# Patient Record
Sex: FEMALE | Race: WHITE | NOT HISPANIC OR LATINO | Employment: OTHER | URBAN - METROPOLITAN AREA
[De-identification: names, ages, dates, MRNs, and addresses within clinical notes are randomized per-mention and may not be internally consistent; named-entity substitution may affect disease eponyms.]

---

## 2017-04-17 DIAGNOSIS — E07.9 THYROID EYE DISEASE: ICD-10-CM

## 2017-04-17 DIAGNOSIS — H57.89 THYROID EYE DISEASE: ICD-10-CM

## 2017-04-17 RX ORDER — CYCLOSPORINE 0.5 MG/ML
1 EMULSION OPHTHALMIC 2 TIMES DAILY
Qty: 180 EACH | Refills: 1 | Status: SHIPPED | OUTPATIENT
Start: 2017-04-17 | End: 2018-08-28

## 2017-04-17 NOTE — TELEPHONE ENCOUNTER
cycloSPORINE (RESTASIS) 0.05 % ophthalmic emulsion  Last Written Prescription Date:  6/23/15  Last Fill Quantity: 180,   # refills: 4  Last Office Visit with G, [facility] or [facility] prescribing provider:  4/26/16  Future Office visit:   none    Routing refill request to provider for review/approval because:   cycloSPORINE (RESTASIS) 0.05 % ophthalmic emulsion.  Needs annual appt.

## 2017-07-20 ENCOUNTER — OFFICE VISIT (OUTPATIENT)
Dept: OPHTHALMOLOGY | Facility: CLINIC | Age: 72
End: 2017-07-20
Attending: OPHTHALMOLOGY
Payer: MEDICARE

## 2017-07-20 DIAGNOSIS — H17.9 CORNEAL SCAR: Primary | ICD-10-CM

## 2017-07-20 DIAGNOSIS — H16.203 KERATOCONJUNCTIVITIS, BILATERAL: ICD-10-CM

## 2017-07-20 DIAGNOSIS — H33.8 OLD RETINAL DETACHMENT, PARTIAL: ICD-10-CM

## 2017-07-20 DIAGNOSIS — H10.11 ACUTE ATOPIC CONJUNCTIVITIS OF RIGHT EYE: ICD-10-CM

## 2017-07-20 DIAGNOSIS — E07.9 THYROID EYE DISEASE: ICD-10-CM

## 2017-07-20 DIAGNOSIS — H57.89 THYROID EYE DISEASE: ICD-10-CM

## 2017-07-20 PROCEDURE — 99214 OFFICE O/P EST MOD 30 MIN: CPT | Mod: ZF

## 2017-07-20 PROCEDURE — 92015 DETERMINE REFRACTIVE STATE: CPT | Mod: ZF

## 2017-07-20 ASSESSMENT — TONOMETRY
OD_IOP_MMHG: 13
IOP_METHOD: ICARE
OS_IOP_MMHG: 12

## 2017-07-20 ASSESSMENT — CONF VISUAL FIELD
OD_NORMAL: 1
OS_NORMAL: 1

## 2017-07-20 ASSESSMENT — CUP TO DISC RATIO
OD_RATIO: 0.1
OS_RATIO: 0.1

## 2017-07-20 ASSESSMENT — VISUAL ACUITY
METHOD: SNELLEN - LINEAR
OD_PH_SC: 20/80-
OS_SC: 20/20-2
OD_SC: 20/100

## 2017-07-20 ASSESSMENT — REFRACTION_MANIFEST
OS_AXIS: 105
OS_CYLINDER: +0.50
OS_SPHERE: -0.75
OS_ADD: +2.50
OD_SPHERE: PLANO
OD_ADD: +2.50

## 2017-07-20 ASSESSMENT — EXTERNAL EXAM - RIGHT EYE: OD_EXAM: NORMAL

## 2017-07-20 NOTE — MR AVS SNAPSHOT
After Visit Summary   7/20/2017    Annabelle Newsome    MRN: [id number]           Patient Information     Date Of Birth          1945        Visit Information        Provider Department      7/20/2017 9:30 AM Aayush Maria MD [facility]        Today's Diagnoses     Corneal scar - Right Eye    -  1    Thyroid eye disease        Old retinal detachment, partial        Keratoconjunctivitis, bilateral        Acute atopic conjunctivitis of right eye           Follow-ups after your visit        Follow-up notes from your care team     Return in about 1 year (around 7/20/2018) for Annual Visit.      Who to contact     Please call your clinic at [phone] to:    Ask questions about your health    Make or cancel appointments    Discuss your medicines    Learn about your test results    Speak to your doctor   If you have compliments or concerns about an experience at your clinic, or if you wish to file a complaint, please contact [facility] Patient Relations at [phone] or email us at [email or web address]         Additional Information About Your Visit        MyChart Information     [employer] gives you secure access to your electronic health record. If you see a primary care provider, you can also send messages to your care team and make appointments. If you have questions, please call your primary care clinic.  If you do not have a primary care provider, please call [phone] and they will assist you.      [employer] is an electronic gateway that provides easy, online access to your medical records. With [employer], you can request a clinic appointment, read your test results, renew a prescription or communicate with your care team.     To access your existing account, please contact your [facility] or call [phone] for assistance.        Care EveryWhere ID     This is your Care EveryWhere ID. This could be used by other  organizations to access your [place] medical records  [id number]         Blood Pressure from Last 3 Encounters:   No data found for BP    Weight from Last 3 Encounters:   No data found for Wt              Today, you had the following     No orders found for display         Today's Medication Changes          These changes are accurate as of: 7/20/17 10:10 AM.  If you have any questions, ask your nurse or doctor.               Stop taking these medicines if you haven't already. Please contact your care team if you have questions.     carboxymethylcellulose sodium 0.5 % Soln ophthalmic solution   Generic drug:  carboxymethylcellulose   Stopped by:  Aayush Maria MD                Where to get your medicines      These medications were sent to [id number] - [place] - [address] AT [address]  [address], [place] [address]     Phone:  [phone]     fluorometholone 0.25 % ophthalmic susp                Primary Care Provider Office Phone # Fax #    [facility] [place] [facility] [phone] [address]  [place] [address]        Equal Access to Services     [facility]: Hadii aad ku hadasho Soomaali, waaxda luqadaha, qaybta kaalmada adeegyada, waxay jeff haymarlene major . So [facility] [phone].    ATENCIÓN: Si habla español, tiene a klein disposición servicios gratuitos de asistencia lingüística. [facility] [phone].    We comply with applicable federal civil rights laws and [place] laws. We do not discriminate on the basis of race, color, national origin, age, disability sex, sexual orientation or gender identity.            Thank you!     Thank you for choosing EYE CLINIC  for your care. Our goal is always to provide you with excellent care. Hearing back from our patients is one way we can continue to improve our services. Please take a few minutes to complete the written survey that you may receive in the mail after  your visit with us. Thank you!             Your Updated Medication List - Protect others around you: Learn how to safely use, store and throw away your medicines at www.disposemymeds.org.          This list is accurate as of: 7/20/17 10:10 AM.  Always use your most recent med list.                   Brand Name Dispense Instructions for use Diagnosis    ADVAIR DISKUS 100-50 MCG/DOSE diskus inhaler   Generic drug:  fluticasone-salmeterol      Inhale 1 puff into the lungs every 12 hours        ALBUTEROL IN      Inhale 2 Puffs/kg into the lungs as needed        ascorbic acid 1000 MG Tabs    vitamin C     Take 1,000 mg by mouth daily        BILBERRY EXTRACT PO      Take 1 tablet by mouth daily        Cranberry 1000 MG Caps      Take 1 capsule by mouth daily        cycloSPORINE 0.05 % ophthalmic emulsion    RESTASIS    180 each    Place 1 drop into both eyes 2 times daily    Thyroid eye disease       EPIPEN 0.3 MG/0.3ML injection 2-pack   Generic drug:  EPINEPHrine      Inject 0.3 mg into the muscle once as needed        fluorometholone 0.25 % ophthalmic susp    FML FORTE    1 Bottle    Three times a day for one week, twice a day for one week, once a day for one week right eye    Acute atopic conjunctivitis of right eye       levothyroxine 125 MCG tablet    SYNTHROID/LEVOTHROID     Take 1 tablet by mouth daily        Lutein 40 MG Caps      Take 1 tablet by mouth daily        MULTIVITAL PO      Take 1 tablet by mouth daily        NASACORT AQ 55 MCG/ACT nasal inhaler   Generic drug:  triamcinolone      Spray 2 sprays into both nostrils daily        PRED FORTE 1 % ophthalmic susp   Generic drug:  prednisoLONE acetate      Place 1 drop into the right eye as needed        REFRESH P.M. Oint      Place 1 Application into both eyes At Bedtime        SYSTANE BALANCE 0.6 % Soln ophthalmic solution   Generic drug:  propylene glycol      Place 1 drop into both eyes daily as needed

## 2017-07-20 NOTE — PROGRESS NOTES
Annabelle Newsome is a 71 year old female presenting for 1 year  follow up.      Interval history:  Doing well. No change in vision. Eyes comfortable. No flashing lights, floaters, dark spots.   Using artificial tears/ointment PRN.    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     Drops:   Refresh Preservative free q1-2 h both eyes   Refresh PM at bedtime   Systane balance in the middle of the night both eyes   Restasis BID    A/P:    1. Corneal scar right eye, stable   Scleral lens as needed for vision but not currently wearing    2. Dry eye, doing well    Continue current regimen and add warm compresses, lid hygiene   Continue restasis    3. Thyroid eye disease, quiet   H/o multiple lid surgeries right eye    Stable, ANNE 0 today   Does not smoke   Monitor    4. H/o Retinal detachment  Both eyes    Retinal detachment  Precautions      return to clinic 1 year dilated fundus exam      Layo Del Rio MD      ~~~~~~~~~~~~~~~~~~~~~~~~~~~~~~~~~~~~~~~~~~~~~~~~~~~~~~~~~~~~~~~~    Complete documentation of historical and exam elements from today's encounter can be found in the full encounter summary report (not reduplicated in this progress note). I personally obtained the chief complaint(s) and history of present illness.  I confirmed and edited as necessary the review of systems, past medical/surgical history, family history, social history, and examination findings as documented by others.  I examined the patient myself, and I personally reviewed the relevant tests, images, and reports as documented above. I formulated and edited as necessary the assessment and plan and discussed the findings and management plan with the patient  and family.     Aayush Maria MD, MA  Director, Cornea & Anterior Segment  [facility] Department of Ophthalmology & Visual Neuroscience

## 2017-07-20 NOTE — NURSING NOTE
Chief Complaints and History of Present Illnesses   Patient presents with     Follow Up For     corneal scar RE     HPI    Affected eye(s):  Both   Symptoms:     Blurred vision   No decreased vision   Dryness         Do you have eye pain now?:  No      Comments:  VA seems stable. Dryness improved. Using restasis, systane balance and refresh pm only. Has pred forte that she uses on rare occasion.     Deja FERREIRA July 20, 2017 9:10 AM

## 2017-07-27 ENCOUNTER — TELEPHONE (OUTPATIENT)
Dept: OPHTHALMOLOGY | Facility: CLINIC | Age: 72
End: 2017-07-27

## 2017-07-27 NOTE — TELEPHONE ENCOUNTER
Called patient to discuss possibly switching FML to Pred Forte as she was concerned about cost. Reports she has already filled FML       Layo Del Rio MD

## 2017-07-31 RX ORDER — LOTEPREDNOL ETABONATE 2 MG/ML
1 SUSPENSION/ DROPS OPHTHALMIC 3 TIMES DAILY
Qty: 1 BOTTLE | Refills: 11 | Status: SHIPPED | OUTPATIENT
Start: 2017-07-31 | End: 2017-09-06

## 2017-08-01 ENCOUNTER — TELEPHONE (OUTPATIENT)
Dept: OPHTHALMOLOGY | Facility: CLINIC | Age: 72
End: 2017-08-01

## 2017-08-01 NOTE — TELEPHONE ENCOUNTER
FML Forte 0.25% susp is approved for non-formulary exception through 12/31/17 under Medicare Part D benefit. Reference#:  [id number]. [place] Argueta has been notified.

## 2017-09-06 ENCOUNTER — OFFICE VISIT (OUTPATIENT)
Dept: OPHTHALMOLOGY | Facility: CLINIC | Age: 72
End: 2017-09-06
Attending: OPHTHALMOLOGY
Payer: MEDICARE

## 2017-09-06 DIAGNOSIS — H35.351 CYSTOID MACULAR EDEMA, RIGHT: Primary | ICD-10-CM

## 2017-09-06 DIAGNOSIS — H35.371 ERM OD (EPIRETINAL MEMBRANE, RIGHT EYE): ICD-10-CM

## 2017-09-06 DIAGNOSIS — H53.16 PSYCHOPHYSICAL VISUAL DISTURBANCES: ICD-10-CM

## 2017-09-06 PROCEDURE — 99213 OFFICE O/P EST LOW 20 MIN: CPT | Mod: 25,ZF

## 2017-09-06 PROCEDURE — 92134 CPTRZ OPH DX IMG PST SGM RTA: CPT | Mod: ZF | Performed by: OPHTHALMOLOGY

## 2017-09-06 PROCEDURE — 92250 FUNDUS PHOTOGRAPHY W/I&R: CPT | Mod: ZF | Performed by: OPHTHALMOLOGY

## 2017-09-06 PROCEDURE — 92235 FLUORESCEIN ANGRPH MLTIFRAME: CPT | Mod: ZF | Performed by: OPHTHALMOLOGY

## 2017-09-06 ASSESSMENT — CUP TO DISC RATIO
OS_RATIO: 0.1
OD_RATIO: 0.1

## 2017-09-06 ASSESSMENT — EXTERNAL EXAM - RIGHT EYE: OD_EXAM: NORMAL

## 2017-09-06 ASSESSMENT — SLIT LAMP EXAM - LIDS: COMMENTS: PTOSIS, MGD

## 2017-09-06 ASSESSMENT — VISUAL ACUITY
OS_SC: 20/20
OS_SC+: -2
OD_SC+: -2
OD_SC: 20/70
METHOD: SNELLEN - LINEAR

## 2017-09-06 ASSESSMENT — CONF VISUAL FIELD
OS_NORMAL: 1
OD_NORMAL: 1
METHOD: COUNTING FINGERS

## 2017-09-06 ASSESSMENT — REFRACTION_WEARINGRX
OS_SPHERE: -0.75
OS_ADD: +2.50
OS_AXIS: 105
OD_SPHERE: PLANO
OD_ADD: +2.50
OS_CYLINDER: +0.50

## 2017-09-06 ASSESSMENT — TONOMETRY
IOP_METHOD: TONOPEN
OD_IOP_MMHG: 15
OS_IOP_MMHG: 12

## 2017-09-06 NOTE — PROGRESS NOTES
I have confirmed the patient's and reviewed Past Medical History, Past Surgical History, Social History, Family History, Problem List, Medication List and agree with Tech note.    CC: evaluation for possible solar retinopathy    HPI: patient of Dr. Maria.      Assessment/plan:  1. Cystoid Macular Edema Right Eye   - Epiretinal membrane noted on OCT   - confirmed by IVFA   - trial with nevanac right eye four times a day      2. Corneal scar right eye, stable  - follows with Dr. Maria, Scleral lens as needed for vision but not currently wearing       3. Thyroid eye disease, quiet  -  H/o multiple lid surgeries right eye      4. H/o Retinal detachment  Both eyes   - early 2000s , details unknown, retina attached    5. 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 Gillette and agree with the findings in this note, as well as the interpretations of the diagnostic tests.      Ramandeep Guzman MD PhD.  Professor & Chair

## 2017-09-06 NOTE — NURSING NOTE
Chief Complaints and History of Present Illnesses   Patient presents with     New Patient     Visual disturbance, more noticable double vision.     HPI    Affected eye(s):  Both   Symptoms:     No redness   No Dryness         Do you have eye pain now?:  No      Comments:  Pt noticing double vision (side by side) more over the past 2 weeks. Pt has had double vision for many years. Pt unable to tolerate prisms in glasses. Pt notes that she peeked at the Eclipse (with only her sunglasses) 2 weeks ago and may have damaged her vision. No eye pain today. No change in flashers or floaters.  Pt also notes that she tripped over a dog and hit the right side of her face on the cement last night.    Olya REIS September 6, 2017 7:21 AM

## 2017-09-06 NOTE — MR AVS SNAPSHOT
After Visit Summary   9/6/2017    Annabelle Newsome    MRN: [id number]           Patient Information     Date Of Birth          1945        Visit Information        Provider Department      9/6/2017 8:30 AM Ramandeep Dewitt MD [facility]        Today's Diagnoses     Cystoid macular edema, right    -  1    Psychophysical visual disturbances        ERM OD (epiretinal membrane, right eye)           Follow-ups after your visit        Follow-up notes from your care team     Return in about 3 weeks (around 9/27/2017) for CME OD , Exam & OCT OD.      Your next 10 appointments already scheduled     Sep 28, 2017  7:45 AM CDT   RETURN RETINA with Ramandeep Dewitt MD   Eye Clinic ([facility])    [address]  [address]  [address]  [facility] [place] [address] [phone]              Future tests that were ordered for you today     Open Future Orders        Priority Expected Expires Ordered    OCT Retina Spectralis OD (right eye) Routine  3/10/2019 9/6/2017            Who to contact     Please call your clinic at [phone] to:    Ask questions about your health    Make or cancel appointments    Discuss your medicines    Learn about your test results    Speak to your doctor   If you have compliments or concerns about an experience at your clinic, or if you wish to file a complaint, please contact [facility] Patient Relations at [phone] or email us at [email or web address]         Additional Information About Your Visit        MyChart Information     [employer] gives you secure access to your electronic health record. If you see a primary care provider, you can also send messages to your care team and make appointments. If you have questions, please call your primary care clinic.  If you do not have a primary care provider, please call [phone] and they will assist you.      [email or web address] is an electronic gateway  that provides easy, online access to your medical records. With [email or web address], you can request a clinic appointment, read your test results, renew a prescription or communicate with your care team.     To access your existing account, please contact your [facility] or call [phone] for assistance.        Care EveryWhere ID     This is your Care EveryWhere ID. This could be used by other organizations to access your [place] medical records  [id number]         Blood Pressure from Last 3 Encounters:   No data found for BP    Weight from Last 3 Encounters:   No data found for Wt              We Performed the Following     Fluorescein Angiography OU (both eyes)     Fundus Autofluorescence Image (FAF) OU (both eyes)     Multicolor Image OU (both eyes)     OCT Retina Spectralis OU (both eyes)          Today's Medication Changes          These changes are accurate as of: 9/6/17 10:29 AM.  If you have any questions, ask your nurse or doctor.               Start taking these medicines.        Dose/Directions    nepafenac 0.1 % ophthalmic susp   Commonly known as:  NEVANAC   Used for:  Cystoid macular edema, right   Started by:  Ramandeep Dewitt MD        Dose:  1 drop   Place 1 drop into the right eye 3 times daily   Quantity:  1 Bottle   Refills:  1            Where to get your medicines      [id number] [address]  [address] [place] [address]     Phone:  [phone]     nepafenac 0.1 % ophthalmic susp                Primary Care Provider Office Phone # Fax #    [facility] [phone]       [facility] [address]        Equal Access to Services     AZAM CEVALLOS : Amelia Barrera, walexus lumarcella, qaybtabatha gonzalez. So [facility] [phone].    ATENCIÓN: Hemanth nelson  español, tiene a klein disposición servicios gratuitos de asistencia lingüística. Arnaldo henderson [phone].    We comply with applicable federal civil rights laws and [place] laws. We do not discriminate on the basis of race, color, national origin, age, disability sex, sexual orientation or gender identity.            Thank you!     Thank you for choosing [facility]  for your care. Our goal is always to provide you with excellent care. Hearing back from our patients is one way we can continue to improve our services. Please take a few minutes to complete the written survey that you may receive in the mail after your visit with us. Thank you!             Your Updated Medication List - Protect others around you: Learn how to safely use, store and throw away your medicines at www.disposemymeds.org.          This list is accurate as of: 9/6/17 10:29 AM.  Always use your most recent med list.                   Brand Name Dispense Instructions for use Diagnosis    ADVAIR DISKUS 100-50 MCG/DOSE diskus inhaler   Generic drug:  fluticasone-salmeterol      Inhale 1 puff into the lungs every 12 hours        ALBUTEROL IN      Inhale 2 Puffs/kg into the lungs as needed        ascorbic acid 1000 MG Tabs    vitamin C     Take 1,000 mg by mouth daily        BILBERRY EXTRACT PO      Take 1 tablet by mouth daily        Cranberry 1000 MG Caps      Take 1 capsule by mouth daily        cycloSPORINE 0.05 % ophthalmic emulsion    RESTASIS    180 each    Place 1 drop into both eyes 2 times daily    Thyroid eye disease       EPIPEN 0.3 MG/0.3ML injection 2-pack   Generic drug:  EPINEPHrine      Inject 0.3 mg into the muscle once as needed        levothyroxine 125 MCG tablet    SYNTHROID/LEVOTHROID     Take 1 tablet by mouth daily        Lutein 40 MG Caps      Take 1 tablet by mouth daily        MULTIVITAL PO      Take 1 tablet by mouth daily        NASACORT AQ 55 MCG/ACT nasal inhaler   Generic drug:  triamcinolone      Spray 2 sprays into  both nostrils daily        nepafenac 0.1 % ophthalmic susp    NEVANAC    1 Bottle    Place 1 drop into the right eye 3 times daily    Cystoid macular edema, right       PRED FORTE 1 % ophthalmic susp   Generic drug:  prednisoLONE acetate      Place 1 drop into the right eye as needed        REFRESH P.M. Oint      Place 1 Application into both eyes At Bedtime        SYSTANE BALANCE 0.6 % Soln ophthalmic solution   Generic drug:  propylene glycol      Place 1 drop into both eyes daily as needed

## 2017-09-28 ENCOUNTER — OFFICE VISIT (OUTPATIENT)
Dept: OPHTHALMOLOGY | Facility: CLINIC | Age: 72
End: 2017-09-28
Attending: OPHTHALMOLOGY
Payer: MEDICARE

## 2017-09-28 DIAGNOSIS — H35.351 CYSTOID MACULAR EDEMA, RIGHT: ICD-10-CM

## 2017-09-28 PROCEDURE — 92134 CPTRZ OPH DX IMG PST SGM RTA: CPT | Mod: ZF | Performed by: OPHTHALMOLOGY

## 2017-09-28 PROCEDURE — 99212 OFFICE O/P EST SF 10 MIN: CPT | Mod: ZF

## 2017-09-28 ASSESSMENT — VISUAL ACUITY
OD_SC+: -1
OS_SC+: -3
OD_SC: 20/80
METHOD: SNELLEN - LINEAR
OS_SC: 20/25
OS_PH_SC: 20/25
OD_PH_SC: 20/70-2

## 2017-09-28 ASSESSMENT — CONF VISUAL FIELD
OS_NORMAL: 1
METHOD: COUNTING FINGERS
OD_NORMAL: 1

## 2017-09-28 ASSESSMENT — SLIT LAMP EXAM - LIDS: COMMENTS: PTOSIS, MGD

## 2017-09-28 ASSESSMENT — EXTERNAL EXAM - RIGHT EYE: OD_EXAM: NORMAL

## 2017-09-28 ASSESSMENT — REFRACTION_WEARINGRX
OD_ADD: +2.50
OS_SPHERE: -0.75
OS_ADD: +2.50
OD_SPHERE: PLANO
OS_AXIS: 105
OS_CYLINDER: +0.50

## 2017-09-28 ASSESSMENT — CUP TO DISC RATIO
OD_RATIO: 0.1
OS_RATIO: 0.1

## 2017-09-28 ASSESSMENT — TONOMETRY
OS_IOP_MMHG: 11
OD_IOP_MMHG: 15
IOP_METHOD: TONOPEN

## 2017-09-28 NOTE — NURSING NOTE
Chief Complaints and History of Present Illnesses   Patient presents with     Follow Up For     3 week follow up Cystoid Macular Edema Right Eye     HPI    Affected eye(s):  Right   Symptoms:     No floaters   No flashes   No redness   No Dryness         Do you have eye pain now?:  No      Comments:  Pt states vision is about the same as last visit. Pt seeing a slight film in LE today.    Olya REIS September 28, 2017 7:35 AM

## 2017-09-28 NOTE — PROGRESS NOTES
I have confirmed the patient's and reviewed Past Medical History, Past Surgical History, Social History, Family History, Problem List, Medication List and agree with Tech note.    CC: cystoid macular edema right eye       HPI: patient of Dr. Maria, was started on nevanac three times a day OD      Assessment/plan:  1. Cystoid Macular Edema Right Eye   - Epiretinal membrane noted on OCT   - confirmed by IVFA   - trial with nevanac right eye three times a day      2. Corneal scar right eye, stable  - follows with Dr. Maria, Scleral lens as needed for vision but not currently wearing       3. Thyroid eye disease, quiet  -  H/o multiple lid surgeries right eye      4. H/o Retinal detachment  Both eyes   - early 2000s , details unknown, retina attached    5. 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  Professor & Chair

## 2017-09-28 NOTE — MR AVS SNAPSHOT
After Visit Summary   9/28/2017    Annabelle Newsome    MRN: [id number]           Patient Information     Date Of Birth          1945        Visit Information        Provider Department      9/28/2017 7:45 AM Ramandeep Dewitt MD [facility]        Today's Diagnoses     Cystoid macular edema, right           Follow-ups after your visit        Follow-up notes from your care team     Return in about 3 weeks (around 10/19/2017) for CME OD .      Future tests that were ordered for you today     Open Future Orders        Priority Expected Expires Ordered    OCT Retina Spectralis OD (right eye) Routine  4/1/2019 9/28/2017            Who to contact     Please call your clinic at [phone] to:    Ask questions about your health    Make or cancel appointments    Discuss your medicines    Learn about your test results    Speak to your doctor   If you have compliments or concerns about an experience at your clinic, or if you wish to file a complaint, please contact [facility] Patient Relations at [phone] or email us at [email or web address]         Additional Information About Your Visit        MyChart Information     Network [employer] gives you secure access to your electronic health record. If you see a primary care provider, you can also send messages to your care team and make appointments. If you have questions, please call your primary care clinic.  If you do not have a primary care provider, please call [phone] and they will assist you.      Refle[employer] is an electronic gateway that provides easy, online access to your medical records. With [employer], you can request a clinic appointment, read your test results, renew a prescription or communicate with your care team.     To access your existing account, please contact your [facility] or call [phone] for assistance.        Care EveryWhere ID     This is your Care  EveryWhere ID. This could be used by other organizations to access your [place] medical records  [id number]         Blood Pressure from Last 3 Encounters:   No data found for BP    Weight from Last 3 Encounters:   No data found for Wt              We Performed the Following     OCT Retina Spectralis OD (right eye)        Primary Care Provider Office Phone # Fax #    [facility] [phone] [address] [address]        Equal Access to Services     JOAO CEVALLOS : Hadii aad ku hadasho Soomaali, waaxda luqadaha, qaybta kaalmada adeegyada, waxay idiin hayaan adeeg kharash la'rohitn . So [facility] [phone].    ATENCIÓN: Si habla español, tiene a klein disposición servicios gratuitos de asistencia lingüística. [facility] [phone].    We comply with applicable federal civil rights laws and [place] laws. We do not discriminate on the basis of race, color, national origin, age, disability sex, sexual orientation or gender identity.            Thank you!     Thank you for choosing EYE CLINIC  for your care. Our goal is always to provide you with excellent care. Hearing back from our patients is one way we can continue to improve our services. Please take a few minutes to complete the written survey that you may receive in the mail after your visit with us. Thank you!             Your Updated Medication List - Protect others around you: Learn how to safely use, store and throw away your medicines at www.disposemymeds.org.          This list is accurate as of: 9/28/17  8:29 AM.  Always use your most recent med list.                   Brand Name Dispense Instructions for use Diagnosis    ADVAIR DISKUS 100-50 MCG/DOSE diskus inhaler   Generic drug:  fluticasone-salmeterol      Inhale 1 puff into the lungs every 12 hours        ALBUTEROL IN      Inhale 2 Puffs/kg into the lungs as needed        ascorbic acid 1000 MG Tabs    vitamin C     Take 1,000 mg by mouth daily        BILBERRY  EXTRACT PO      Take 1 tablet by mouth daily        Cranberry 1000 MG Caps      Take 1 capsule by mouth daily        cycloSPORINE 0.05 % ophthalmic emulsion    RESTASIS    180 each    Place 1 drop into both eyes 2 times daily    Thyroid eye disease       EPIPEN 0.3 MG/0.3ML injection 2-pack   Generic drug:  EPINEPHrine      Inject 0.3 mg into the muscle once as needed        levothyroxine 125 MCG tablet    SYNTHROID/LEVOTHROID     Take 1 tablet by mouth daily        Lutein 40 MG Caps      Take 1 tablet by mouth daily        MULTIVITAL PO      Take 1 tablet by mouth daily        NASACORT AQ 55 MCG/ACT nasal inhaler   Generic drug:  triamcinolone      Spray 2 sprays into both nostrils daily        nepafenac 0.1 % ophthalmic susp    NEVANAC    1 Bottle    Place 1 drop into the right eye 3 times daily    Cystoid macular edema, right       PRED FORTE 1 % ophthalmic susp   Generic drug:  prednisoLONE acetate      Place 1 drop into the right eye as needed        REFRESH P.M. Oint      Place 1 Application into both eyes At Bedtime        SYSTANE BALANCE 0.6 % Soln ophthalmic solution   Generic drug:  propylene glycol      Place 1 drop into both eyes daily as needed

## 2017-10-19 ENCOUNTER — OFFICE VISIT (OUTPATIENT)
Dept: OPHTHALMOLOGY | Facility: CLINIC | Age: 72
End: 2017-10-19
Attending: OPHTHALMOLOGY
Payer: MEDICARE

## 2017-10-19 DIAGNOSIS — H35.351 CYSTOID MACULAR EDEMA, RIGHT: ICD-10-CM

## 2017-10-19 PROCEDURE — 92134 CPTRZ OPH DX IMG PST SGM RTA: CPT | Mod: ZF | Performed by: OPHTHALMOLOGY

## 2017-10-19 PROCEDURE — 99212 OFFICE O/P EST SF 10 MIN: CPT | Mod: ZF

## 2017-10-19 ASSESSMENT — SLIT LAMP EXAM - LIDS: COMMENTS: PTOSIS, MGD

## 2017-10-19 ASSESSMENT — TONOMETRY
OD_IOP_MMHG: 9
OS_IOP_MMHG: 17
IOP_METHOD: TONOPEN

## 2017-10-19 ASSESSMENT — VISUAL ACUITY
METHOD: SNELLEN - LINEAR
OD_PH_SC: 20/60-2
OS_SC+: -2
OD_SC+: +1
OD_SC: 20/80
OS_SC: 20/25

## 2017-10-19 ASSESSMENT — CUP TO DISC RATIO
OS_RATIO: 0.1
OD_RATIO: 0.1

## 2017-10-19 ASSESSMENT — EXTERNAL EXAM - RIGHT EYE: OD_EXAM: NORMAL

## 2017-10-19 ASSESSMENT — CONF VISUAL FIELD
OD_NORMAL: 1
OS_NORMAL: 1

## 2017-10-19 NOTE — MR AVS SNAPSHOT
After Visit Summary   10/19/2017    Annabelle Newsome    MRN: [id number]           Patient Information     Date Of Birth          1945        Visit Information        Provider Department      10/19/2017 8:15 AM [facility]andeep Dewitt MD [facility]        Today's Diagnoses     Cystoid macular edema, right - Right Eye           Follow-ups after your visit        Follow-up notes from your care team     Return in about 2 weeks (around 11/2/2017) for DFE, OCT Mac, CME.      Your next 10 appointments already scheduled     Oct 25, 2017 10:45 AM CDT   RETURN RETINA with Ramandeep Dewitt MD   [facility] ([facility])    [address]  [address]  [address]  [place] [facility] [address] [phone]              Future tests that were ordered for you today     Open Future Orders        Priority Expected Expires Ordered    OCT Retina Spectralis OU (both eyes) Routine  4/22/2019 10/19/2017            Who to contact     Please call your clinic at [phone] to:    Ask questions about your health    Make or cancel appointments    Discuss your medicines    Learn about your test results    Speak to your doctor   If you have compliments or concerns about an experience at your clinic, or if you wish to file a complaint, please contact [facility] Patient Relations at [phone] or email us at [email or web address]         Additional Information About Your Visit        MyChart Information     [employer] gives you secure access to your electronic health record. If you see a primary care provider, you can also send messages to your care team and make appointments. If you have questions, please call your primary care clinic.  If you do not have a primary care provider, please call [phone] and they will assist you.      [employer] is an electronic gateway that provides easy, online access to your medical records. With [employer], you  can request a clinic appointment, read your test results, renew a prescription or communicate with your care team.     To access your existing account, please contact your [facility] or call [phone] for assistance.        Care EveryWhere ID     This is your Care EveryWhere ID. This could be used by other organizations to access your [place] medical records  [id number]         Blood Pressure from Last 3 Encounters:   No data found for BP    Weight from Last 3 Encounters:   No data found for Wt              We Performed the Following     OCT Retina Spectralis OD (right eye)          Today's Medication Changes          These changes are accurate as of: 10/19/17  8:51 AM.  If you have any questions, ask your nurse or doctor.               These medicines have changed or have updated prescriptions.        Dose/Directions    * nepafenac 0.1 % ophthalmic susp   Commonly known as:  NEVANAC   This may have changed:  Another medication with the same name was added. Make sure you understand how and when to take each.   Used for:  Cystoid macular edema, right   Changed by:  Ramandeep Dewitt MD        Dose:  1 drop   Place 1 drop into the right eye 3 times daily   Quantity:  1 Bottle   Refills:  1       * nepafenac 0.1 % ophthalmic susp   Commonly known as:  NEVANAC   This may have changed:  You were already taking a medication with the same name, and this prescription was added. Make sure you understand how and when to take each.   Used for:  Cystoid macular edema, right   Changed by:  Ramandeep Dewitt MD        Dose:  1 drop   Place 1 drop into the right eye 3 times daily   Quantity:  1 Bottle   Refills:  11       * Notice:  This list has 2 medication(s) that are the same as other medications prescribed for you. Read the directions carefully, and ask your doctor or other care provider to review them with you.         Where to get your medicines      These  medications were sent to [id number] - [place], [place] - [address]  [address], [place] [address]     Phone:  [phone]     nepafenac 0.1 % ophthalmic susp                Primary Care Provider Office Phone # Fax #    [facility] [phone] [address]  [place] [address]        Equal Access to Services     JOAO CEVALLOS : Hadii aad ku hadasho Soomaali, waaxda luqadaha, qaybta kaalmada adeegyada, waxay idiin hayaan adeeg kharash la'marlene . So [facility] [phone].    ATENCIÓN: Si habla español, tiene a klein disposición servicios gratuitos de asistencia lingüística. [facility] [phone].    We comply with applicable federal civil rights laws and [place] laws. We do not discriminate on the basis of race, color, national origin, age, disability, sex, sexual orientation, or gender identity.            Thank you!     Thank you for choosing EYE CLINIC  for your care. Our goal is always to provide you with excellent care. Hearing back from our patients is one way we can continue to improve our services. Please take a few minutes to complete the written survey that you may receive in the mail after your visit with us. Thank you!             Your Updated Medication List - Protect others around you: Learn how to safely use, store and throw away your medicines at www.disposemymeds.org.          This list is accurate as of: 10/19/17  8:51 AM.  Always use your most recent med list.                   Brand Name Dispense Instructions for use Diagnosis    ADVAIR DISKUS 100-50 MCG/DOSE diskus inhaler   Generic drug:  fluticasone-salmeterol      Inhale 1 puff into the lungs every 12 hours        ALBUTEROL IN      Inhale 2 Puffs/kg into the lungs as needed        ascorbic acid 1000 MG Tabs    vitamin C     Take 1,000 mg by mouth daily        BILBERRY EXTRACT PO      Take 1 tablet by mouth daily        Cranberry 1000 MG Caps      Take 1  capsule by mouth daily        cycloSPORINE 0.05 % ophthalmic emulsion    RESTASIS    180 each    Place 1 drop into both eyes 2 times daily    Thyroid eye disease       EPIPEN 0.3 MG/0.3ML injection 2-pack   Generic drug:  EPINEPHrine      Inject 0.3 mg into the muscle once as needed        levothyroxine 125 MCG tablet    SYNTHROID/LEVOTHROID     Take 1 tablet by mouth daily        Lutein 40 MG Caps      Take 1 tablet by mouth daily        MULTIVITAL PO      Take 1 tablet by mouth daily        NASACORT AQ 55 MCG/ACT nasal inhaler   Generic drug:  triamcinolone      Spray 2 sprays into both nostrils daily        * nepafenac 0.1 % ophthalmic susp    NEVANAC    1 Bottle    Place 1 drop into the right eye 3 times daily    Cystoid macular edema, right       * nepafenac 0.1 % ophthalmic susp    NEVANAC    1 Bottle    Place 1 drop into the right eye 3 times daily    Cystoid macular edema, right       PRED FORTE 1 % ophthalmic susp   Generic drug:  prednisoLONE acetate      Place 1 drop into the right eye as needed        REFRESH P.M. Oint      Place 1 Application into both eyes At Bedtime        SYSTANE BALANCE 0.6 % Soln ophthalmic solution   Generic drug:  propylene glycol      Place 1 drop into both eyes daily as needed        * Notice:  This list has 2 medication(s) that are the same as other medications prescribed for you. Read the directions carefully, and ask your doctor or other care provider to review them with you.

## 2017-10-19 NOTE — PROGRESS NOTES
I have confirmed the patient's and reviewed Past Medical History, Past Surgical History, Social History, Family History, Problem List, Medication List and agree with Tech note.    CC: cystoid macular edema right eye       HPI: patient of Dr. Maria, noted to have cystoid macular edema with Epiretinal membrane on visit in 09/2017, was started on nevanac three times a day right eye, no change in vision        Assessment/plan:  1. Cystoid Macular Edema Right Eye   - Epiretinal membrane noted on OCT   - confirmed by IVFA   - no improvement on nevanac alone   - will add Predforte  Four times a day  Right eye , recheck in two weeks   - she is travelling to [place], plan for membrane peal in the spring of 2018 if no improvement on two drops.    2. Corneal scar right eye, stable  - follows with Dr. Maria, Scleral lens as needed for vision but not currently wearing       3. Thyroid eye disease, quiet  -  H/o multiple lid surgeries right eye      4. H/o Retinal detachment  Both eyes   - early 2000s , details unknown, retina attached    5. 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 Gillette and agree with the findings in this note, as well as the interpretations of the diagnostic tests.      Ramandeep Guzman MD PhD.  Professor & Chair

## 2017-10-19 NOTE — NURSING NOTE
Chief Complaints and History of Present Illnesses   Patient presents with     Follow Up For     CME RE      HPI    Last Eye Exam:  9/28/17   Affected eye(s):  Right   Symptoms:     Double vision      Duration:  3 weeks   Frequency:  Constant       Do you have eye pain now?:  No      Comments:  Pt returns for 3 week follow up. Pt feels that vision is stable. Denies any pain. Denies any new floaters. No problems using Nevanac, last drop used last night. NHAN IZAGUIRRE, COA 7:56 AM 10/19/2017

## 2017-10-20 DIAGNOSIS — H35.351 CYSTOID MACULAR DEGENERATION OF RIGHT EYE: Primary | ICD-10-CM

## 2017-10-20 RX ORDER — PREDNISOLONE ACETATE 10 MG/ML
1 SUSPENSION/ DROPS OPHTHALMIC 4 TIMES DAILY
Qty: 1 BOTTLE | Refills: 1 | Status: SHIPPED | OUTPATIENT
Start: 2017-10-20 | End: 2019-09-23

## 2017-10-20 NOTE — TELEPHONE ENCOUNTER
Pt states had FML at home, not prednisolone   New Rx fro prednisolone sent to pt's pharmacy  Alfredo Royal RN 8:23 AM 10/20/17

## 2017-10-25 ENCOUNTER — OFFICE VISIT (OUTPATIENT)
Dept: OPHTHALMOLOGY | Facility: CLINIC | Age: 72
End: 2017-10-25
Attending: OPHTHALMOLOGY
Payer: MEDICARE

## 2017-10-25 DIAGNOSIS — H35.351 CYSTOID MACULAR EDEMA, RIGHT: ICD-10-CM

## 2017-10-25 PROCEDURE — 99213 OFFICE O/P EST LOW 20 MIN: CPT | Mod: ZF

## 2017-10-25 PROCEDURE — 92134 CPTRZ OPH DX IMG PST SGM RTA: CPT | Mod: ZF | Performed by: OPHTHALMOLOGY

## 2017-10-25 ASSESSMENT — SLIT LAMP EXAM - LIDS: COMMENTS: PTOSIS, MGD

## 2017-10-25 ASSESSMENT — VISUAL ACUITY
OD_PH_SC: 20/60-2
OS_SC: 20/20
METHOD: SNELLEN - LINEAR
OD_SC: 20/125

## 2017-10-25 ASSESSMENT — CUP TO DISC RATIO
OS_RATIO: 0.1
OD_RATIO: 0.1

## 2017-10-25 ASSESSMENT — EXTERNAL EXAM - RIGHT EYE: OD_EXAM: NORMAL

## 2017-10-25 ASSESSMENT — CONF VISUAL FIELD
OD_NORMAL: 1
METHOD: COUNTING FINGERS
OS_NORMAL: 1

## 2017-10-25 ASSESSMENT — TONOMETRY
OS_IOP_MMHG: 15
IOP_METHOD: TONOPEN
OD_IOP_MMHG: 20

## 2017-10-25 NOTE — NURSING NOTE
Chief Complaints and History of Present Illnesses   Patient presents with     Follow Up For     Cystoid macular edema, right - Right Eye      HPI    Last Eye Exam:  10/19/17   Affected eye(s):  Right   Symptoms:        Unknown duration    Frequency:  Constant       Do you have eye pain now?:  No      Comments:  She is here today for a follow up of Cystoid macular edema, right - Right Eye   She says her vision is about the same as last visit.    David Maradiaga COT 11:15 AM October 25, 2017

## 2017-10-25 NOTE — PROGRESS NOTES
I have confirmed the patient's and reviewed Past Medical History, Past Surgical History, Social History, Family History, Problem List, Medication List and agree with Tech note.    CC: cystoid macular edema right eye       HPI: patient of Dr. Maria, noted to have cystoid macular edema with Epiretinal membrane on visit in 09/2017, was started on nevanac three times a day right eye, with no change in vision and two weeks ago we added PF 1% and now she can see much better.       Assessment/plan:  1. Cystoid Macular Edema Right Eye   - Epiretinal membrane noted on OCT   - confirmed by IVFA   - no improvement on nevanac alone, started PF 1% four times a day  4 weeks ago    - will taper Predforte to three times a day Right eye , recheck in two-three weeks in [place]    - she is travelling to [place], plan for membrane peal in the spring of 2018 if no improvement on two drops.    2. Corneal scar right eye, stable  - follows with Dr. Maria, Scleral lens as needed for vision but not currently wearing       3. Thyroid eye disease, quiet  -  H/o multiple lid surgeries right eye      4. H/o Retinal detachment  Both eyes   - early 2000s , details unknown, retina attached    5. 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 Doe in [place].      Ramandeep Guzman MD PhD.  Professor & Chair

## 2017-10-25 NOTE — LETTER
10/25/2017       RE: Annabelle Newsome  [address] [address]     Dear Colleague,    Thank you for referring your patient, Annabelle Newsome, to the EYE CLINIC at [facility]. Please see a copy of my visit note below.    I have confirmed the patient's and reviewed Past Medical History, Past Surgical History, Social History, Family History, Problem List, Medication List and agree with Tech note.    CC: cystoid macular edema right eye       HPI: patient of Dr. Maria, noted to have cystoid macular edema with Epiretinal membrane on visit in 09/2017, was started on nevanac three times a day right eye, with no change in vision and two weeks ago we added PF 1% and now she can see much better.       Assessment/plan:  1. Cystoid Macular Edema Right Eye   - Epiretinal membrane noted on OCT   - confirmed by IVFA   - no improvement on nevanac alone, started PF 1% four times a day  4 weeks ago    - will taper Predforte to three times a day Right eye , recheck in two-three weeks in [place]    - she is travelling to [place], plan for membrane peal in the spring of 2018 if no improvement on two drops.    2. Corneal scar right eye, stable  - follows with Dr. Maria, Scleral lens as needed for vision but not currently wearing       3. Thyroid eye disease, quiet  -  H/o multiple lid surgeries right eye      4. H/o Retinal detachment  Both eyes   - early 2000s , details unknown, retina attached    5. 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 Doe in [place].      Ramandeep Guzman MD PhD.  Professor & Chair        Again, thank you for allowing me to participate in the care of your patient.      Sincerely,    Ramandeep  Anurag Dewitt MD

## 2017-10-25 NOTE — MR AVS SNAPSHOT
After Visit Summary   10/25/2017    Annabelle Newsome    MRN: [id number]           Patient Information     Date Of Birth          1945        Visit Information        Provider Department      10/25/2017 10:45 AM Ramandeep Dewitt MD [facility]        Today's Diagnoses     Cystoid macular edema, right - Right Eye           Follow-ups after your visit        Follow-up notes from your care team     Return in about 6 months (around 4/25/2018) for CME OD and ERM , Exam & OCT OU.      Future tests that were ordered for you today     Open Future Orders        Priority Expected Expires Ordered    OCT Retina Spectralis OU (both eyes) Routine  4/28/2019 10/25/2017            Who to contact     Please call your clinic at [phone] to:    Ask questions about your health    Make or cancel appointments    Discuss your medicines    Learn about your test results    Speak to your doctor   If you have compliments or concerns about an experience at your clinic, or if you wish to file a complaint, please contact [facility] Patient Relations at [phone] or email us at [email or web address]         Additional Information About Your Visit        MyChart Information     The Meishijie websitet gives you secure access to your electronic health record. If you see a primary care provider, you can also send messages to your care team and make appointments. If you have questions, please call your primary care clinic.  If you do not have a primary care provider, please call [phone] and they will assist you.      The Meishijie websitet is an electronic gateway that provides easy, online access to your medical records. With [email or web address], you can request a clinic appointment, read your test results, renew a prescription or communicate with your care team.     To access your existing account, please contact your [facility] or call [phone] for assistance.        Care  EveryWhere ID     This is your Care EveryWhere ID. This could be used by other organizations to access your [facility] medical records  [id number]         Blood Pressure from Last 3 Encounters:   No data found for BP    Weight from Last 3 Encounters:   No data found for Wt              We Performed the Following     OCT Retina Spectralis OU (both eyes)        Primary Care Provider Office Phone # Fax #    [facility] [phone] [address] [facility] [address]        Equal Access to Services     JOAO CEVALLOS : Hadii aad ku hadasho Soomaali, waaxda luqadaha, qaybta kaalmada adeegyada, waxay idiin hayaan adeeg kharash laidalia . So [facility] [phone].    ATENCIÓN: Si habla español, tiene a klein disposición servicios gratuitos de asistencia lingüística. [facility] of [phone].    We comply with applicable federal civil rights laws and [place] laws. We do not discriminate on the basis of race, color, national origin, age, disability, sex, sexual orientation, or gender identity.            Thank you!     Thank you for choosing EYE CLINIC  for your care. Our goal is always to provide you with excellent care. Hearing back from our patients is one way we can continue to improve our services. Please take a few minutes to complete the written survey that you may receive in the mail after your visit with us. Thank you!             Your Updated Medication List - Protect others around you: Learn how to safely use, store and throw away your medicines at www.disposemymeds.org.          This list is accurate as of: 10/25/17 11:59 AM.  Always use your most recent med list.                   Brand Name Dispense Instructions for use Diagnosis    ADVAIR DISKUS 100-50 MCG/DOSE diskus inhaler   Generic drug:  fluticasone-salmeterol      Inhale 1 puff into the lungs every 12 hours        ALBUTEROL IN      Inhale 2 Puffs/kg into the lungs as needed        ascorbic acid 1000 MG Tabs    vitamin C     Take  1,000 mg by mouth daily        BILBERRY EXTRACT PO      Take 1 tablet by mouth daily        Cranberry 1000 MG Caps      Take 1 capsule by mouth daily        cycloSPORINE 0.05 % ophthalmic emulsion    RESTASIS    180 each    Place 1 drop into both eyes 2 times daily    Thyroid eye disease       EPIPEN 0.3 MG/0.3ML injection 2-pack   Generic drug:  EPINEPHrine      Inject 0.3 mg into the muscle once as needed        levothyroxine 125 MCG tablet    SYNTHROID/LEVOTHROID     Take 1 tablet by mouth daily        Lutein 40 MG Caps      Take 1 tablet by mouth daily        MULTIVITAL PO      Take 1 tablet by mouth daily        NASACORT AQ 55 MCG/ACT nasal inhaler   Generic drug:  triamcinolone      Spray 2 sprays into both nostrils daily        * nepafenac 0.1 % ophthalmic susp    NEVANAC    1 Bottle    Place 1 drop into the right eye 3 times daily    Cystoid macular edema, right       * nepafenac 0.1 % ophthalmic susp    NEVANAC    1 Bottle    Place 1 drop into the right eye 3 times daily    Cystoid macular edema, right       prednisoLONE acetate 1 % ophthalmic susp    PRED FORTE    1 Bottle    Place 1 drop into the right eye 4 times daily    Cystoid macular degeneration of right eye       REFRESH P.M. Oint      Place 1 Application into both eyes At Bedtime        SYSTANE BALANCE 0.6 % Soln ophthalmic solution   Generic drug:  propylene glycol      Place 1 drop into both eyes daily as needed        * Notice:  This list has 2 medication(s) that are the same as other medications prescribed for you. Read the directions carefully, and ask your doctor or other care provider to review them with you.

## 2017-11-09 ENCOUNTER — TRANSFERRED RECORDS (OUTPATIENT)
Dept: HEALTH INFORMATION MANAGEMENT | Facility: CLINIC | Age: 72
End: 2017-11-09

## 2017-12-14 ENCOUNTER — TRANSFERRED RECORDS (OUTPATIENT)
Dept: HEALTH INFORMATION MANAGEMENT | Facility: CLINIC | Age: 72
End: 2017-12-14

## 2018-01-11 ENCOUNTER — TRANSFERRED RECORDS (OUTPATIENT)
Dept: HEALTH INFORMATION MANAGEMENT | Facility: CLINIC | Age: 73
End: 2018-01-11

## 2018-02-08 ENCOUNTER — TRANSFERRED RECORDS (OUTPATIENT)
Dept: HEALTH INFORMATION MANAGEMENT | Facility: CLINIC | Age: 73
End: 2018-02-08

## 2018-03-29 ENCOUNTER — TRANSFERRED RECORDS (OUTPATIENT)
Dept: HEALTH INFORMATION MANAGEMENT | Facility: CLINIC | Age: 73
End: 2018-03-29

## 2018-06-04 ENCOUNTER — OFFICE VISIT (OUTPATIENT)
Dept: OPHTHALMOLOGY | Facility: CLINIC | Age: 73
End: 2018-06-04
Attending: OPHTHALMOLOGY
Payer: MEDICARE

## 2018-06-04 DIAGNOSIS — H35.351 CYSTOID MACULAR EDEMA, RIGHT: ICD-10-CM

## 2018-06-04 PROCEDURE — 92134 CPTRZ OPH DX IMG PST SGM RTA: CPT | Mod: ZF | Performed by: OPHTHALMOLOGY

## 2018-06-04 PROCEDURE — G0463 HOSPITAL OUTPT CLINIC VISIT: HCPCS | Mod: ZF

## 2018-06-04 RX ORDER — DUREZOL 0.5 MG/ML
EMULSION OPHTHALMIC
COMMUNITY
Start: 2018-05-01 | End: 2018-06-04

## 2018-06-04 ASSESSMENT — VISUAL ACUITY
OD_SC+: -1
OD_SC: 20/70
OS_SC: 20/20
OS_SC+: -2
METHOD: SNELLEN - LINEAR

## 2018-06-04 ASSESSMENT — CONF VISUAL FIELD
METHOD: COUNTING FINGERS
OS_NORMAL: 1
OD_NORMAL: 1

## 2018-06-04 ASSESSMENT — CUP TO DISC RATIO
OS_RATIO: 0.1
OD_RATIO: 0.1

## 2018-06-04 ASSESSMENT — SLIT LAMP EXAM - LIDS: COMMENTS: PTOSIS, MGD

## 2018-06-04 ASSESSMENT — TONOMETRY
OD_IOP_MMHG: 13
IOP_METHOD: TONOPEN
OS_IOP_MMHG: 11

## 2018-06-04 ASSESSMENT — EXTERNAL EXAM - RIGHT EYE: OD_EXAM: NORMAL

## 2018-06-04 NOTE — MR AVS SNAPSHOT
After Visit Summary   6/4/2018    Annabelle Newsome    MRN: [id number]           Patient Information     Date Of Birth          1945        Visit Information        Provider Department      6/4/2018 1:45 PM Ramandeep Dewitt MD [facility]        Today's Diagnoses     Cystoid macular edema, right - Right Eye           Follow-ups after your visit        Follow-up notes from your care team     Return in about 5 weeks (around 7/9/2018).      Your next 10 appointments already scheduled     Jul 12, 2018  7:30 AM CDT   RETURN RETINA with Ramandeep Dewitt MD   Eye Clinic ([facility])    [address]  [address] [address]  [facility] [address] [phone]              Future tests that were ordered for you today     Open Future Orders        Priority Expected Expires Ordered    Fluorescein Angiography OU (both eyes) Routine  12/6/2019 6/4/2018    ICG Angiography OU (both eyes) Routine  12/6/2019 6/4/2018    OCT Retina Spectralis OU (both eyes) Routine  12/6/2019 6/4/2018            Who to contact     Please call your clinic at [phone] to:    Ask questions about your health    Make or cancel appointments    Discuss your medicines    Learn about your test results    Speak to your doctor            Additional Information About Your Visit        ZairgeharStraighterLine Information     IM-Sense gives you secure access to your electronic health record. If you see a primary care provider, you can also send messages to your care team and make appointments. If you have questions, please call your primary care clinic.  If you do not have a primary care provider, please call [phone] and they will assist you.      IM-Sense is an electronic gateway that provides easy, online access to your medical records. With IM-Sense, you can request a clinic appointment, read your test results, renew a prescription or communicate with your care team.     To access your  existing account, please contact your [facility] or call [phone] for assistance.        Care EveryWhere ID     This is your Care EveryWhere ID. This could be used by other organizations to access your [facility] medical records  [id number]         Blood Pressure from Last 3 Encounters:   No data found for BP    Weight from Last 3 Encounters:   No data found for Wt              We Performed the Following     OCT Retina Spectralis OU (both eyes)          Today's Medication Changes          These changes are accurate as of 6/4/18  3:20 PM.  If you have any questions, ask your nurse or doctor.               Stop taking these medicines if you haven't already. Please contact your care team if you have questions.     DUREZOL 0.05 % ophthalmic emulsion   Generic drug:  difluprednate                    Primary Care Provider Office Phone # Fax #    [facility] [phone] [address] [facility] [address]        Equal Access to Services     JOAO CEVALLOS : Hadii aad ku hadasho Soomaali, waaxda luqadaha, qaybta kaalmada adeegyada, waxay silviain hayrohitn cassie major . So [facility] [phone].    ATENCIÓN: Si habla español, tiene a klein disposición servicios gratuitos de asistencia lingüística. [facility] al [phone].    We comply with applicable federal civil rights laws and [place] laws. We do not discriminate on the basis of race, color, national origin, age, disability, sex, sexual orientation, or gender identity.            Thank you!     Thank you for choosing EYE CLINIC  for your care. Our goal is always to provide you with excellent care. Hearing back from our patients is one way we can continue to improve our services. Please take a few minutes to complete the written survey that you may receive in the mail after your visit with us. Thank you!             Your Updated Medication List - Protect others around you: Learn how to safely use, store and  throw away your medicines at www.disposemymeds.org.          This list is accurate as of 6/4/18  3:20 PM.  Always use your most recent med list.                   Brand Name Dispense Instructions for use Diagnosis    ADVAIR DISKUS 100-50 MCG/DOSE diskus inhaler   Generic drug:  fluticasone-salmeterol      Inhale 1 puff into the lungs every 12 hours        ALBUTEROL IN      Inhale 2 Puffs/kg into the lungs as needed        ascorbic acid 1000 MG Tabs    vitamin C     Take 1,000 mg by mouth daily        BILBERRY EXTRACT PO      Take 1 tablet by mouth daily        Cranberry 1000 MG Caps      Take 1 capsule by mouth daily        cycloSPORINE 0.05 % ophthalmic emulsion    RESTASIS    180 each    Place 1 drop into both eyes 2 times daily    Thyroid eye disease       EPIPEN 0.3 MG/0.3ML injection 2-pack   Generic drug:  EPINEPHrine      Inject 0.3 mg into the muscle once as needed        levothyroxine 125 MCG tablet    SYNTHROID/LEVOTHROID     Take 1 tablet by mouth daily        Lutein 40 MG Caps      Take 1 tablet by mouth daily        MULTIVITAL PO      Take 1 tablet by mouth daily        NASACORT AQ 55 MCG/ACT nasal inhaler   Generic drug:  triamcinolone      Spray 2 sprays into both nostrils daily        * nepafenac 0.1 % ophthalmic susp    NEVANAC    1 Bottle    Place 1 drop into the right eye 3 times daily    Cystoid macular edema, right       * nepafenac 0.1 % ophthalmic susp    NEVANAC    1 Bottle    Place 1 drop into the right eye 3 times daily    Cystoid macular edema, right       prednisoLONE acetate 1 % ophthalmic susp    PRED FORTE    1 Bottle    Place 1 drop into the right eye 4 times daily    Cystoid macular degeneration of right eye       REFRESH P.M. Oint      Place 1 Application into both eyes At Bedtime        SYSTANE BALANCE 0.6 % Soln ophthalmic solution   Generic drug:  propylene glycol      Place 1 drop into both eyes daily as needed        * Notice:  This list has 2 medication(s) that are the same as  other medications prescribed for you. Read the directions carefully, and ask your doctor or other care provider to review them with you.

## 2018-06-04 NOTE — PROGRESS NOTES
I have confirmed the patient's and reviewed Past Medical History, Past Surgical History, Social History, Family History, Problem List, Medication List and agree with Tech note.    CC: cystoid macular edema right eye       HPI: patient of Dr. Maria, noted to have cystoid macular edema with Epiretinal membrane on visit in 09/2017, was started on nevanac three times a day right eye, with no change in vision and two weeks ago we added PF 1% and now she can see much better. Patient was in [place] over the Winter and was switched to Durezol and has been of that since May 4th.  Was seen by associated retina consultants in [place]        Assessment/plan:  1. Cystoid Macular Edema Right Eye   - Epiretinal membrane noted on OCT   - confirmed by IVFA   - no improvement on nevanac alone, started PF 1% four times a day  4 weeks ago    - will taper Predforte to three times a day Right eye , recheck in two-three weeks in [place]    - she is travelling to [place], plan for membrane peal in the spring of 2018 if no improvement on two drops.    2. Corneal scar right eye, stable  - follows with Dr. Maria, Scleral lens as needed for vision but not currently wearing    3. Thyroid eye disease, quiet  -  H/o multiple lid surgeries right eye      4. H/o Retinal detachment  Both eyes   - early 2000s , details unknown, retina attached    5. 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  Professor & Chair

## 2018-06-04 NOTE — NURSING NOTE
Chief Complaints and History of Present Illnesses   Patient presents with     Follow Up For     Cystoid macular edema, right - Right Eye / and ERM     HPI    Last Eye Exam:  10/25/17   Affected eye(s):  Right   Symptoms:        Unknown duration    Frequency:  Constant       Do you have eye pain now?:  No      Comments:  Annabelle is here today for a follow up of Cystoid macular edema, right - Right Eye / and ERM  She is unsure if her vision is any better since last visit. While she was in [place], she saw a retinal specialist there who put her on Durezol. She sees floaters, but nho worse than it has been.     David Maradiaga COT 2:28 PM June 4, 2018

## 2018-07-12 ENCOUNTER — OFFICE VISIT (OUTPATIENT)
Dept: OPHTHALMOLOGY | Facility: CLINIC | Age: 73
End: 2018-07-12
Attending: OPHTHALMOLOGY
Payer: MEDICARE

## 2018-07-12 DIAGNOSIS — E07.9 THYROID EYE DISEASE: ICD-10-CM

## 2018-07-12 DIAGNOSIS — H57.89 THYROID EYE DISEASE: ICD-10-CM

## 2018-07-12 DIAGNOSIS — H35.351 CYSTOID MACULAR EDEMA, RIGHT: ICD-10-CM

## 2018-07-12 PROCEDURE — 92134 CPTRZ OPH DX IMG PST SGM RTA: CPT | Mod: ZF | Performed by: OPHTHALMOLOGY

## 2018-07-12 PROCEDURE — 92242 FLUORESCEIN&ICG ANGIOGRAPHY: CPT

## 2018-07-12 PROCEDURE — G0463 HOSPITAL OUTPT CLINIC VISIT: HCPCS | Mod: 25,ZF

## 2018-07-12 PROCEDURE — 92240 ICG ANGIOGRAPHY I&R UNI/BI: CPT | Mod: ZF | Performed by: OPHTHALMOLOGY

## 2018-07-12 PROCEDURE — 92235 FLUORESCEIN ANGRPH MLTIFRAME: CPT | Mod: ZF | Performed by: OPHTHALMOLOGY

## 2018-07-12 ASSESSMENT — TONOMETRY
OD_IOP_MMHG: 11
OS_IOP_MMHG: 10
IOP_METHOD: TONOPEN

## 2018-07-12 ASSESSMENT — EXTERNAL EXAM - RIGHT EYE: OD_EXAM: NORMAL

## 2018-07-12 ASSESSMENT — VISUAL ACUITY
OD_SC: 20/80
OD_PH_SC: 20/60
METHOD: SNELLEN - LINEAR
OS_SC: 20/20
OS_SC+: -2

## 2018-07-12 ASSESSMENT — CUP TO DISC RATIO
OS_RATIO: 0.1
OD_RATIO: 0.1

## 2018-07-12 ASSESSMENT — SLIT LAMP EXAM - LIDS: COMMENTS: PTOSIS, MGD

## 2018-07-12 ASSESSMENT — CONF VISUAL FIELD
OD_NORMAL: 1
OS_NORMAL: 1

## 2018-07-12 NOTE — PROGRESS NOTES
I have confirmed the patient's and reviewed Past Medical History, Past Surgical History, Social History, Family History, Problem List, Medication List and agree with Tech note.    CC: cystoid macular edema right eye       HPI: patient of Dr. Maria, noted to have cystoid macular edema with Epiretinal membrane on visit in 09/2017, was started on nevanac three times a day right eye, with no change in vision and two weeks ago we added PF 1% and now she can see much better. Patient was in [place] over the Winter and was switched to Durezol and has been of that since May 4th, 2018 and we discontinued that on 6/4/2018.  Was seen by associated retina consultants in [place]        Assessment/plan:  1. Cystoid Macular Edema Right Eye   - Epiretinal membrane noted on OCT   - confirmed by IVFA again today.  No polyps on ICG   - no improvement on nevanac alone, started PF 1% four times a day which helped and was stopped in [place]   - plan for membrane peal reviewed with Beulah Hurst MD and deferred at this time    - RAYSHAWN no improvement visual acuity     2. Corneal scar right eye, stable  - follows with Dr. Maria, Scleral lens as needed for vision but not currently wearing    3. Thyroid eye disease, quiet  -  H/o multiple lid surgeries right eye      4. H/o Retinal detachment  Both eyes   - early 2000s , details unknown, retina attached    5. 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  Professor & Chair

## 2018-07-12 NOTE — MR AVS SNAPSHOT
After Visit Summary   7/12/2018    Annabelle Newsome    MRN: [id number]           Patient Information     Date Of Birth          1945        Visit Information        Provider Department      7/12/2018 7:30 AM Ramandeep Dewitt MD [facility]        Today's Diagnoses     Cystoid macular edema, right - Right Eye           Follow-ups after your visit        Follow-up notes from your care team     Return in about 2 months (around 9/12/2018) for CME, , Exam & OCT OU.      Your next 10 appointments already scheduled     Sep 13, 2018  7:30 AM CDT   RETURN RETINA with Ramandeep Dewitt MD   Eye Clinic ([facility])    [address]  [address]  [facility] [id number] [phone]              Who to contact     Please call your clinic at [phone] to:    Ask questions about your health    Make or cancel appointments    Discuss your medicines    Learn about your test results    Speak to your doctor            Additional Information About Your Visit        MyChart Information     [employer] gives you secure access to your electronic health record. If you see a primary care provider, you can also send messages to your care team and make appointments. If you have questions, please call your primary care clinic.  If you do not have a primary care provider, please call [phone] and they will assist you.      Jo[employer]uru is an electronic gateway that provides easy, online access to your medical records. With [employer], you can request a clinic appointment, read your test results, renew a prescription or communicate with your care team.     To access your existing account, please contact your [facility] or call [phone] for assistance.        Care EveryWhere ID     This is your Care EveryWhere ID. This could be used by other organizations to access your [facility] medical records  [id number]         Blood  Pressure from Last 3 Encounters:   No data found for BP    Weight from Last 3 Encounters:   No data found for Wt              We Performed the Following     Fluorescein Angiography OU (both eyes)     ICG Angiography OU (both eyes)     OCT Retina Spectralis OU (both eyes)        Primary Care Provider Office Phone # Fax #    [facility] [phone] [address] [facility] [address]        Equal Access to Services     JOAO CEVALLOS : Hadii aad ku hadasho Soomaali, waaxda luqadaha, qaybta kaalmada adeegyada, waxay idiin hayaan adeeg kharash la'aan ah. So [facility] [phone].    ATENCIÓN: Si omar vincent, tiene a klein disposición servicios gratuitos de asistencia lingüística. Llame al [phone].    We comply with applicable federal civil rights laws and [place] laws. We do not discriminate on the basis of race, color, national origin, age, disability, sex, sexual orientation, or gender identity.            Thank you!     Thank you for choosing EYE CLINIC  for your care. Our goal is always to provide you with excellent care. Hearing back from our patients is one way we can continue to improve our services. Please take a few minutes to complete the written survey that you may receive in the mail after your visit with us. Thank you!             Your Updated Medication List - Protect others around you: Learn how to safely use, store and throw away your medicines at www.disposemymeds.org.          This list is accurate as of 7/12/18 10:32 AM.  Always use your most recent med list.                   Brand Name Dispense Instructions for use Diagnosis    ADVAIR DISKUS 100-50 MCG/DOSE diskus inhaler   Generic drug:  fluticasone-salmeterol      Inhale 1 puff into the lungs every 12 hours        ALBUTEROL IN      Inhale 2 Puffs/kg into the lungs as needed        ascorbic acid 1000 MG Tabs    vitamin C     Take 1,000 mg by mouth daily        BILBERRY EXTRACT PO      Take 1 tablet by mouth daily         Cranberry 1000 MG Caps      Take 1 capsule by mouth daily        cycloSPORINE 0.05 % ophthalmic emulsion    RESTASIS    180 each    Place 1 drop into both eyes 2 times daily    Thyroid eye disease       EPIPEN 0.3 MG/0.3ML injection 2-pack   Generic drug:  EPINEPHrine      Inject 0.3 mg into the muscle once as needed        levothyroxine 125 MCG tablet    SYNTHROID/LEVOTHROID     Take 1 tablet by mouth daily        Lutein 40 MG Caps      Take 1 tablet by mouth daily        MULTIVITAL PO      Take 1 tablet by mouth daily        NASACORT AQ 55 MCG/ACT nasal inhaler   Generic drug:  triamcinolone      Spray 2 sprays into both nostrils daily        * nepafenac 0.1 % ophthalmic susp    NEVANAC    1 Bottle    Place 1 drop into the right eye 3 times daily    Cystoid macular edema, right       * nepafenac 0.1 % ophthalmic susp    NEVANAC    1 Bottle    Place 1 drop into the right eye 3 times daily    Cystoid macular edema, right       prednisoLONE acetate 1 % ophthalmic susp    PRED FORTE    1 Bottle    Place 1 drop into the right eye 4 times daily    Cystoid macular degeneration of right eye       REFRESH P.M. Oint      Place 1 Application into both eyes At Bedtime        SYSTANE BALANCE 0.6 % Soln ophthalmic solution   Generic drug:  propylene glycol      Place 1 drop into both eyes daily as needed        * Notice:  This list has 2 medication(s) that are the same as other medications prescribed for you. Read the directions carefully, and ask your doctor or other care provider to review them with you.

## 2018-07-12 NOTE — NURSING NOTE
Chief Complaints and History of Present Illnesses   Patient presents with     Follow Up For     CME RE     HPI    Last Eye Exam:  6/4/18   Affected eye(s):  Right   Symptoms:        Duration:  1 month   Frequency:  Constant       Do you have eye pain now?:  No      Comments:  Pt returns for 1 month follow up. Vision seems stable. Notes that she has not used any antiinflammatory drop last several weeks. BE feel good and comfortable. NHAN IZAGUIRRE, COA 7:24 AM 07/12/2018

## 2018-08-27 ENCOUNTER — MYC MEDICAL ADVICE (OUTPATIENT)
Dept: OPHTHALMOLOGY | Facility: CLINIC | Age: 73
End: 2018-08-27

## 2018-08-27 DIAGNOSIS — E07.9 THYROID EYE DISEASE: ICD-10-CM

## 2018-08-27 DIAGNOSIS — H04.129 DRY EYE: Primary | ICD-10-CM

## 2018-08-27 DIAGNOSIS — H57.89 THYROID EYE DISEASE: ICD-10-CM

## 2018-08-28 RX ORDER — CYCLOSPORINE 0.5 MG/ML
1 EMULSION OPHTHALMIC 2 TIMES DAILY
Qty: 180 EACH | Refills: 1 | Status: SHIPPED | OUTPATIENT
Start: 2018-08-28 | End: 2018-08-29

## 2018-08-29 RX ORDER — CYCLOSPORINE 0.5 MG/ML
1 EMULSION OPHTHALMIC 2 TIMES DAILY
Qty: 180 EACH | Refills: 1 | Status: SHIPPED | OUTPATIENT
Start: 2018-08-29 | End: 2019-06-13

## 2018-08-29 NOTE — TELEPHONE ENCOUNTER
Patient just called in.  RX Restasis was sent to [facility] in error.  As per her mychart request, this was suppose to be sent to [facility].  She contacted [facility] and told them she wasn't coming to pick it up.  Please resend to the Mail Order Pharmacy as per patients original request.

## 2018-09-13 ENCOUNTER — OFFICE VISIT (OUTPATIENT)
Dept: OPHTHALMOLOGY | Facility: CLINIC | Age: 73
End: 2018-09-13
Attending: OPHTHALMOLOGY
Payer: MEDICARE

## 2018-09-13 DIAGNOSIS — H35.373 EPIRETINAL MEMBRANE (ERM) OF BOTH EYES: Primary | ICD-10-CM

## 2018-09-13 PROCEDURE — 92134 CPTRZ OPH DX IMG PST SGM RTA: CPT | Mod: ZF | Performed by: OPHTHALMOLOGY

## 2018-09-13 PROCEDURE — G0463 HOSPITAL OUTPT CLINIC VISIT: HCPCS | Mod: ZF

## 2018-09-13 ASSESSMENT — TONOMETRY
OS_IOP_MMHG: 11
IOP_METHOD: TONOPEN
OD_IOP_MMHG: 13

## 2018-09-13 ASSESSMENT — CONF VISUAL FIELD
OS_NORMAL: 1
METHOD: COUNTING FINGERS
OD_NORMAL: 1

## 2018-09-13 ASSESSMENT — SLIT LAMP EXAM - LIDS: COMMENTS: PTOSIS, MGD

## 2018-09-13 ASSESSMENT — VISUAL ACUITY
OS_SC: 20/20
OD_PH_SC: 20/60
OD_SC: 20/70
OD_SC+: -1
METHOD: SNELLEN - LINEAR

## 2018-09-13 ASSESSMENT — EXTERNAL EXAM - RIGHT EYE: OD_EXAM: NORMAL

## 2018-09-13 ASSESSMENT — CUP TO DISC RATIO
OS_RATIO: 0.1
OD_RATIO: 0.1

## 2018-09-13 NOTE — MR AVS SNAPSHOT
After Visit Summary   9/13/2018    Annabelle Newsome    MRN: [id number]           Patient Information     Date Of Birth          1945        Visit Information        Provider Department      9/13/2018 7:30 AM [facility]andeep Dewitt MD [facility]        Today's Diagnoses     Epiretinal membrane (ERM) of both eyes - Both Eyes    -  1       Follow-ups after your visit        Follow-up notes from your care team     Return for DFE, OCT Mac, ERM.      Your next 10 appointments already scheduled     Apr 24, 2019  7:30 AM CDT   RETURN RETINA with Ramandeep Dewitt MD   Eye Clinic ([facility])    [address]  9[address] Clin [address]  [facility] [address] [phone]              Who to contact     Please call your clinic at [phone] to:    Ask questions about your health    Make or cancel appointments    Discuss your medicines    Learn about your test results    Speak to your doctor            Additional Information About Your Visit        [employer] Information     [employer] gives you secure access to your electronic health record. If you see a primary care provider, you can also send messages to your care team and make appointments. If you have questions, please call your primary care clinic.  If you do not have a primary care provider, please call [phone] and they will assist you.      [employer] is an electronic gateway that provides easy, online access to your medical records. With [employer], you can request a clinic appointment, read your test results, renew a prescription or communicate with your care team.     To access your existing account, please contact your [facility] or call [phone] for assistance.        Care EveryWhere ID     This is your Care EveryWhere ID. This could be used by other organizations to access your [place] medical records  [id number]         Blood Pressure from Last 3  Encounters:   No data found for BP    Weight from Last 3 Encounters:   No data found for Wt              We Performed the Following     OCT Retina Spectralis OU (both eyes)        Primary Care Provider Office Phone # Fax #    [facility] [phone] [address] [facility]  [place] [address]        Equal Access to Services     JOAO CEVALLOS : Hadii aad ku hadasho Soomaali, waaxda luqadaha, qaybta kaalmada adeegyada, waxay silviain hayrohitn adeleticia lind laidalia . So [facility] [phone].    ATENCIÓN: Si habla español, tiene a klein disposición servicios gratuitos de asistencia lingüística. [facility] [phone].    We comply with applicable federal civil rights laws and [place] laws. We do not discriminate on the basis of race, color, national origin, age, disability, sex, sexual orientation, or gender identity.            Thank you!     Thank you for choosing EYE CLINIC  for your care. Our goal is always to provide you with excellent care. Hearing back from our patients is one way we can continue to improve our services. Please take a few minutes to complete the written survey that you may receive in the mail after your visit with us. Thank you!             Your Updated Medication List - Protect others around you: Learn how to safely use, store and throw away your medicines at www.disposemymeds.org.          This list is accurate as of 9/13/18  8:36 AM.  Always use your most recent med list.                   Brand Name Dispense Instructions for use Diagnosis    ADVAIR DISKUS 100-50 MCG/DOSE diskus inhaler   Generic drug:  fluticasone-salmeterol      Inhale 1 puff into the lungs every 12 hours        ALBUTEROL IN      Inhale 2 Puffs/kg into the lungs as needed        ascorbic acid 1000 MG Tabs    vitamin C     Take 1,000 mg by mouth daily        BILBERRY EXTRACT PO      Take 1 tablet by mouth daily        Cranberry 1000 MG Caps      Take 1 capsule by mouth daily        cycloSPORINE 0.05 % ophthalmic  emulsion    RESTASIS    180 each    Place 1 drop into both eyes 2 times daily    Dry eye       EPIPEN 0.3 MG/0.3ML injection 2-pack   Generic drug:  EPINEPHrine      Inject 0.3 mg into the muscle once as needed        levothyroxine 125 MCG tablet    SYNTHROID/LEVOTHROID     Take 1 tablet by mouth daily        Lutein 40 MG Caps      Take 1 tablet by mouth daily        MULTIVITAL PO      Take 1 tablet by mouth daily        NASACORT AQ 55 MCG/ACT nasal inhaler   Generic drug:  triamcinolone      Spray 2 sprays into both nostrils daily        * nepafenac 0.1 % ophthalmic susp    NEVANAC    1 Bottle    Place 1 drop into the right eye 3 times daily    Cystoid macular edema, right       * nepafenac 0.1 % ophthalmic susp    NEVANAC    1 Bottle    Place 1 drop into the right eye 3 times daily    Cystoid macular edema, right       prednisoLONE acetate 1 % ophthalmic susp    PRED FORTE    1 Bottle    Place 1 drop into the right eye 4 times daily    Cystoid macular degeneration of right eye       REFRESH P.M. Oint      Place 1 Application into both eyes At Bedtime        SYSTANE BALANCE 0.6 % Soln ophthalmic solution   Generic drug:  propylene glycol      Place 1 drop into both eyes daily as needed        * Notice:  This list has 2 medication(s) that are the same as other medications prescribed for you. Read the directions carefully, and ask your doctor or other care provider to review them with you.

## 2018-09-13 NOTE — NURSING NOTE
Chief Complaints and History of Present Illnesses   Patient presents with     Follow Up For     Two month follow up for Cystoid Macular Edema RE     HPI    Affected eye(s):  Right   Symptoms:     Blurred vision   Floaters   No redness   No tearing   Dryness      Frequency:  Constant       Do you have eye pain now?:  No      Comments:  Pt reports her right eye is feeling fine. No changes in vision since her last visit. Still blurry. No pain or discomfort. Pt says she has an occasional floater in her left eye, which she's had for many years. Pt has been using Restasis for dry eyes and it's helped.    Karel PETTY September 13, 2018 7:34 AM    I have reviewed all information that was taken.  Rashida CLARK 7:54 AM September 13, 2018

## 2018-09-13 NOTE — PROGRESS NOTES
I have confirmed the patient's and reviewed Past Medical History, Past Surgical History, Social History, Family History, Problem List, Medication List and agree with Tech note.    CC: cystoid macular edema right eye     Interval history: no change in vision    HPI: patient of Dr. Maria, noted to have cystoid macular edema with Epiretinal membrane on visit in 09/2017, was started on nevanac three times a day right eye, with no change in vision and two weeks ago we added PF 1% and now she can see much better. Patient was in [place] over the Winter and was switched to Durezol and has been of that since May 4th, 2018 and we discontinued that on 6/4/2018.  Was seen by associated retina consultants in [place]        Assessment/plan:  1. Cystoid Macular Edema Right Eye   - Epiretinal membrane noted on OCT   - confirmed by IVFA again today.  No polyps on ICG   - no improvement on nevanac alone, started PF 1% four times a day which helped and was stopped in [place]   - plan for membrane peal reviewed with Beulah Hurst MD and deferred at this time    - RAYSHAWN no improvement visual acuity    - plan to observe for now, appears stable    2. Corneal scar right eye, stable  - follows with Dr. Maria, Scleral lens as needed for vision but not currently wearing    3. Thyroid eye disease, quiet  -  H/o multiple lid surgeries right eye      4. H/o Retinal detachment  Both eyes   - early 2000s , details unknown, retina attached    5. 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 Gillette and agree with the findings in this note, as well as the interpretations of the  diagnostic tests.      Ramandeep Guzman MD PhD.  Professor & Chair

## 2018-11-09 ENCOUNTER — TRANSFERRED RECORDS (OUTPATIENT)
Dept: HEALTH INFORMATION MANAGEMENT | Facility: CLINIC | Age: 73
End: 2018-11-09

## 2018-12-06 ENCOUNTER — TRANSFERRED RECORDS (OUTPATIENT)
Dept: HEALTH INFORMATION MANAGEMENT | Facility: CLINIC | Age: 73
End: 2018-12-06

## 2019-03-29 ENCOUNTER — TRANSFERRED RECORDS (OUTPATIENT)
Dept: HEALTH INFORMATION MANAGEMENT | Facility: CLINIC | Age: 74
End: 2019-03-29

## 2019-04-25 ENCOUNTER — OFFICE VISIT (OUTPATIENT)
Dept: OPHTHALMOLOGY | Facility: CLINIC | Age: 74
End: 2019-04-25
Attending: OPHTHALMOLOGY
Payer: MEDICARE

## 2019-04-25 DIAGNOSIS — H35.373 EPIRETINAL MEMBRANE (ERM) OF BOTH EYES: Primary | ICD-10-CM

## 2019-04-25 DIAGNOSIS — H35.373 EPIRETINAL MEMBRANE (ERM) OF BOTH EYES: ICD-10-CM

## 2019-04-25 PROCEDURE — 92134 CPTRZ OPH DX IMG PST SGM RTA: CPT | Mod: ZF | Performed by: OPHTHALMOLOGY

## 2019-04-25 PROCEDURE — G0463 HOSPITAL OUTPT CLINIC VISIT: HCPCS | Mod: ZF

## 2019-04-25 ASSESSMENT — VISUAL ACUITY
OD_SC: 20/100
OS_SC: 20/20
OD_PH_SC: 20/60
OS_SC+: -2
METHOD: SNELLEN - LINEAR

## 2019-04-25 ASSESSMENT — TONOMETRY
OD_IOP_MMHG: 19
OS_IOP_MMHG: 12
IOP_METHOD: TONOPEN

## 2019-04-25 ASSESSMENT — CONF VISUAL FIELD
OS_NORMAL: 1
METHOD: COUNTING FINGERS
OD_NORMAL: 1

## 2019-04-25 ASSESSMENT — EXTERNAL EXAM - RIGHT EYE: OD_EXAM: NORMAL

## 2019-04-25 ASSESSMENT — CUP TO DISC RATIO
OD_RATIO: 0.1
OS_RATIO: 0.1

## 2019-04-25 ASSESSMENT — SLIT LAMP EXAM - LIDS: COMMENTS: PTOSIS, MGD

## 2019-04-25 NOTE — NURSING NOTE
Chief Complaints and History of Present Illnesses   Patient presents with     Cystoid Macular Edema Follow Up     Chief Complaint(s) and History of Present Illness(es)     Cystoid Macular Edema Follow Up     Laterality: both eyes    Onset: gradual    Onset: months ago    Quality: States that on Saturday the LE started feeling weird and she started using Durzol    Frequency: constantly    Associated symptoms: double vision (is constant and if she closes 1 eye it goes li).  Negative for floaters and flashes    Pain scale: 0/10              Comments     States va is the same since last visit

## 2019-04-25 NOTE — PROGRESS NOTES
I have confirmed the patient's and reviewed Past Medical History, Past Surgical History, Social History, Family History, Problem List, Medication List and agree with Tech note.    CC: cystoid macular edema right eye     Interval history: no change in vision right eye, fluctuations left eye, double vision more persistent.     HPI: patient of Dr. Maria, noted to have cystoid macular edema with Epiretinal membrane on visit in 09/2017,  Patient was in [place] over the Winter and was switched to Durezol and ketorolac this Winter and was seen by associated retina consultants in [place] this winter.      MEDS:   Ketorolac twice daily  Durezol twice daily     Assessment/plan:  1. Cystoid Macular Edema Right Eye   - Epiretinal membrane noted on OCT   - confirmed by IVFA again today.  No polyps on ICG   - no improvement on nevanac alone, was changed to durezol and ketorolac in [place]   - plan for membrane peal reviewed with Beulah Hurst MD and deferred last year    - Pinhole now improvement visual acuity right eye and recommend manifest refraction    - plan to continue drops right eye as currently using     2. Corneal scar right eye, stable  - follows with Dr. Maria, Scleral lens as needed for vision but not currently wearing    3. Thyroid eye disease, quiet  -  H/o multiple lid surgeries right eye      4. H/o Retinal detachment  Both eyes   - early 2000s , details unknown, retina attached    5. 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 Aayush Maria MD in June 2019 for corneal scar right eye   Jayy Ellis MD for double vision         Ramandeep Guzman MD PhD.  Professor & Chair

## 2019-05-21 ENCOUNTER — OFFICE VISIT (OUTPATIENT)
Dept: OPHTHALMOLOGY | Facility: CLINIC | Age: 74
End: 2019-05-21
Attending: OPHTHALMOLOGY
Payer: MEDICARE

## 2019-05-21 DIAGNOSIS — H53.10 SUBJECTIVE VISUAL DISTURBANCE: ICD-10-CM

## 2019-05-21 DIAGNOSIS — H53.2 DOUBLE VISION: ICD-10-CM

## 2019-05-21 DIAGNOSIS — H50.00 MONOCULAR ESOTROPIA: Primary | ICD-10-CM

## 2019-05-21 DIAGNOSIS — H50.21 HYPERTROPIA OF RIGHT EYE: ICD-10-CM

## 2019-05-21 PROCEDURE — 92060 SENSORIMOTOR EXAMINATION: CPT | Mod: ZF | Performed by: OPHTHALMOLOGY

## 2019-05-21 PROCEDURE — G0463 HOSPITAL OUTPT CLINIC VISIT: HCPCS | Mod: 25,ZF

## 2019-05-21 RX ORDER — DIFLUPREDNATE OPHTHALMIC 0.5 MG/ML
1 EMULSION OPHTHALMIC 4 TIMES DAILY
COMMUNITY
End: 2021-04-29

## 2019-05-21 ASSESSMENT — TONOMETRY
OS_IOP_MMHG: 15
OD_IOP_MMHG: 20
IOP_METHOD: TONOPEN

## 2019-05-21 ASSESSMENT — CONF VISUAL FIELD
OS_NORMAL: 1
OD_NORMAL: 1
METHOD: COUNTING FINGERS

## 2019-05-21 ASSESSMENT — VISUAL ACUITY
OS_SC+: -
OD_SC+: +/-
OD_SC: 20/100
METHOD: SNELLEN - LINEAR
OD_PH_SC: 20/60
OS_SC: 20/20

## 2019-05-21 ASSESSMENT — CUP TO DISC RATIO
OS_RATIO: 0.2
OD_RATIO: 0.1

## 2019-05-21 ASSESSMENT — REFRACTION_MANIFEST
OS_AXIS: 010
OS_SPHERE: -0.75
OS_CYLINDER: +1.00

## 2019-05-21 ASSESSMENT — SLIT LAMP EXAM - LIDS: COMMENTS: PTOSIS, MGD

## 2019-05-21 ASSESSMENT — EXTERNAL EXAM - RIGHT EYE: OD_EXAM: NORMAL

## 2019-05-21 NOTE — PROGRESS NOTES
1. 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  Diplopia began after retinal detachment with scleral buckle in 2001.  Patient has significant aniseikonia which prevents her from establishing and maintaining fusion with any combination of prism.  Of note, her strabismus pattern and measurements were VERY similar to 2012 measurements by Dr. Raygoza at which time surgery was also deemed unlikely to alleviate her diplopia.  I discussed that we could attempt strabismus surgery but that I felt it was more likely than not that she would be unable to fuse still after surgery even with prisms.  Ultimately we mutually decided to defer strabismus surgery.    The patient will consider referral to Dr. Arceo for a contact lens in the right eye to optically blur the vision sufficiently to hide diplopia.    Annabelle Newsome is a pleasant 73 year old White female who presents to my neuro-ophthalmology clinic today     Patient has had diplopia for years.  Diagnosed with thyroid eye disease in 2011.  Dr. Raygoza saw patient in 2012 for constant binocular diplopia.  Patient had aniseikonia back in 2012.  She tried a prism at one point but was intolerant.  Vision from right eye is approximately 60% the size of the left eye.  The right eye vision is also significantly blurred compared to the left eye.    Sensorimotor exam in 2012 (KASHMIR) showed    Primary gaze: right esotropia 15 prism diopter, right hypertropia 12 prism diopters  Right gaze: esotropia 8, right hypertropia 12  Left gaze- esotropia 12, right hypertropia 8  Down gaze- esotropia 15, right hypertropia 5  Double [place]- 5 degrees excyclotorsion in the right eye    Past ocular history:  Retinal detachment in the left eye in 2001- scleral buckle.  Retinal detachment in the right eye 12/2008- status post vitrectomy, cryopexy, endolaser- Dr. Tim.    Past medical history:  Status post thyroidectomy for enlarged gland-  1976  Thyroid eye disease status post right upper lid retraction repair.    Exam: see sensorimotor exam in Epic.    Referral to contact lens specialist- Dr. Sommers.  patient to consider.  Patient could try to blur the right eye vision sufficiently to hide diplopia.         I spent a total of 30minutes face to face with Annabelle Newsome during today's office visit.  Over 50% of this time was spent counseling the patient and/or coordinating care regarding her diplopia.    Complete documentation of historical and exam elements from today's encounter can be found in the full encounter summary report (not reduplicated in this progress note).  I personally obtained the chief complaint(s) and history of present illness.  I confirmed and edited as necessary the review of systems, past medical/surgical history, family history, social history, and examination findings as documented by others; and I examined the patient myself.  I personally reviewed the relevant tests, images, and reports as documented above.  I formulated and edited as necessary the assessment and plan and discussed the findings and management plan with the patient and family.  I personally reviewed the ophthalmic test(s) associated with this encounter, agree with the interpretation(s) as documented by the resident/fellow, and have edited the corresponding report(s) as necessary.     Jayy Ellis MD

## 2019-05-21 NOTE — LETTER
May 21, 2019    RE: Annabelle Newsome  : 1945  MRN: [id number]    Dear Dr. Dewitt    Thank you for referring your patient, Annabelle Newsome, to my neuro-ophthalmology clinic recently.  After a thorough neuro-ophthalmic history and examination, I came to the following conclusions:      1. 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  Diplopia began after retinal detachment with scleral buckle in .  Patient has significant aniseikonia which prevents her from establishing and maintaining fusion with any combination of prism.  Of note, her strabismus pattern and measurements were VERY similar to 2012 measurements by Dr. Raygoza at which time surgery was also deemed unlikely to alleviate her diplopia.  I discussed that we could attempt strabismus surgery but that I felt it was more likely than not that she would be unable to fuse still after surgery even with prisms.  Ultimately we mutually decided to defer strabismus surgery.    The patient will consider referral to Dr. Arceo for a contact lens in the right eye to optically blur the vision sufficiently to hide diplopia.    Annabelle Newsome is a pleasant 73 year old White female who presents to my neuro-ophthalmology clinic today     Patient has had diplopia for years.  Diagnosed with thyroid eye disease in .  Dr. Raygoza saw patient in  for constant binocular diplopia.  Patient had aniseikonia back in .  She tried a prism at one point but was intolerant.  Vision from right eye is approximately 60% the size of the left eye.  The right eye vision is also significantly blurred compared to the left eye.    Sensorimotor exam in 2012 (KASHMIR) showed    Primary gaze: right esotropia 15 prism diopter, right hypertropia 12 prism diopters  Right gaze: esotropia 8, right hypertropia 12  Left gaze- esotropia 12, right hypertropia 8  Down gaze- esotropia 15, right hypertropia 5  Double [place]- 5 degrees  excyclotorsion in the right eye    Past ocular history:  Retinal detachment in the left eye in 2001- scleral buckle.  Retinal detachment in the right eye 12/2008- status post vitrectomy, cryopexy, endolaser- Dr. Tim.    Past medical history:  Status post thyroidectomy for enlarged gland- 1976  Thyroid eye disease status post right upper lid retraction repair.    Exam: see sensorimotor exam in Epic.    Referral to contact lens specialist- Dr. Sommers.  patient to consider.  Patient could try to blur the right eye vision sufficiently to hide diplopia.      Again, thank you for trusting me with the care of your patient.  For further exam details, please feel free to contact our office for additional records.  If you wish to contact me regarding this patient please email me at [email or web address] or give my clinic a call to arrange a phone conversation.    Sincerely,    Jayy Ellis MD  , Neuro-Ophthalmology and Adult Strabismus Surgery  The Jasmyn DUGAN and Melissa Saha Chair in Neuro-Ophthalmology  Department of Ophthalmology and Visual Neurosciences  [facility]

## 2019-06-13 ENCOUNTER — OFFICE VISIT (OUTPATIENT)
Dept: OPHTHALMOLOGY | Facility: CLINIC | Age: 74
End: 2019-06-13
Payer: MEDICARE

## 2019-06-13 DIAGNOSIS — H53.2 DOUBLE VISION: Primary | ICD-10-CM

## 2019-06-13 DIAGNOSIS — H53.10 SUBJECTIVE VISUAL DISTURBANCE: ICD-10-CM

## 2019-06-13 DIAGNOSIS — H04.129 DRY EYE: ICD-10-CM

## 2019-06-13 PROCEDURE — G0463 HOSPITAL OUTPT CLINIC VISIT: HCPCS | Mod: ZF

## 2019-06-13 RX ORDER — CYCLOSPORINE 0.5 MG/ML
1 EMULSION OPHTHALMIC 2 TIMES DAILY
Qty: 180 EACH | Refills: 6 | Status: SHIPPED | OUTPATIENT
Start: 2019-06-13 | End: 2019-06-13

## 2019-06-13 RX ORDER — CYCLOSPORINE 0.5 MG/ML
1 EMULSION OPHTHALMIC 2 TIMES DAILY
Qty: 180 EACH | Refills: 6 | Status: SHIPPED | OUTPATIENT
Start: 2019-06-13 | End: 2019-06-18

## 2019-06-13 ASSESSMENT — VISUAL ACUITY
METHOD: SNELLEN - LINEAR
OS_SC: 20/25
OD_SC+: +1
OD_SC: 20/70
OD_PH_CC: 20/60

## 2019-06-13 ASSESSMENT — CONF VISUAL FIELD
OS_NORMAL: 1
METHOD: COUNTING FINGERS
OD_NORMAL: 1

## 2019-06-13 ASSESSMENT — TONOMETRY
IOP_METHOD: ICARE
OS_IOP_MMHG: 12
OD_IOP_MMHG: 20

## 2019-06-13 ASSESSMENT — SLIT LAMP EXAM - LIDS: COMMENTS: PTOSIS, MGD

## 2019-06-13 ASSESSMENT — EXTERNAL EXAM - RIGHT EYE: OD_EXAM: NORMAL

## 2019-06-13 NOTE — PROGRESS NOTES
Annabelle Newsome is a 73 year old female presenting for 1 year  follow up.      Interval history:  Last seen by Dr. Maria 2017. Here for follow up of K scar and dry eye. Seen by Dr. Dewitt and Dr. Ellis recently. Overall things are stable. No new flashes/floaters.     Episodes of double are less frequent. Has an appt with Dr. Arceo to consider fogging/blurring right eye to alleviate diplopia, but she hasn't decided yet.    Past ocular history:  Retinal detachment both eyes, right eye 12/08, left eye 11/01  Thyroid eye disease s/p multiple lid surgeries  Cataract extraction intraocular lens both eyes   Keratitis and corneal scar right eye   Binocular diplopia, not having any surgery for that      Drops:   Refresh Preservative free q1-2 h both eyes   Refresh PM at bedtime     Restasis BID OU  Durezol -1 drop BID right eye  Ketorolac - 2 drops BID OD    A/P:    1. Corneal scar right eye, stable   -approx 10% thinning - stable   -vision stable    -continue frequent preservative-free tears    2. Dry eye, doing well -stable   -continue restasis - refilled today for 1 year   -continue PF tears QID each eye \    3. Thyroid eye disease, quiet   H/o multiple lid surgeries right eye    -last visit with Caleb - decided not to pursue any surgery at this time    4. H/o Retinal detachment  Both eyes    -reviewed RT/RD precautions    5. Hx of CME, right eye   - currently on durezol BID and ketorolac BID - doing well   - IOP 20 right eye     Patient reports trying a scleral lens some years ago - wasn't able to insert / remove successfully    Could benefit from Rigid gas permeable lens to minimize aniso then wear prisms over top?    Vs wear CTL to fog vision to minimize diplopia?    Refer to Dr. VILLASEÑOR to troubleshoot    F/u cornea 1 year, sooner KAREY Christian MD  PGY-5, Cornea Fellow  Ophthalmology    ~~~~~~~~~~~~~~~~~~~~~~~~~~~~~~~~~~~~~~~~~~~~~~~~~~~~~~~~~~~~~~~~    Complete documentation of historical and  exam elements from today's encounter can be found in the full encounter summary report (not reduplicated in this progress note). I personally obtained the chief complaint(s) and history of present illness.  I confirmed and edited as necessary the review of systems, past medical/surgical history, family history, social history, and examination findings as documented by others.  I examined the patient myself, and I personally reviewed the relevant tests, images, and reports as documented above. I formulated and edited as necessary the assessment and plan and discussed the findings and management plan with the patient and family.     Aayush Maria MD, MA  Director, Cornea & Anterior Segment  [facility] Department of Ophthalmology & Visual Neuroscience

## 2019-06-16 ENCOUNTER — MYC MEDICAL ADVICE (OUTPATIENT)
Dept: OPHTHALMOLOGY | Facility: CLINIC | Age: 74
End: 2019-06-16

## 2019-06-16 DIAGNOSIS — H04.129 DRY EYE: ICD-10-CM

## 2019-06-18 RX ORDER — CYCLOSPORINE 0.5 MG/ML
1 EMULSION OPHTHALMIC 2 TIMES DAILY
Qty: 180 EACH | Refills: 6 | Status: SHIPPED | OUTPATIENT
Start: 2019-06-18 | End: 2020-07-02

## 2019-06-28 ENCOUNTER — OFFICE VISIT (OUTPATIENT)
Dept: OPTOMETRY | Facility: CLINIC | Age: 74
End: 2019-06-28
Payer: MEDICARE

## 2019-06-28 DIAGNOSIS — H53.2 BINOCULAR VISION DISORDER WITH DIPLOPIA: Primary | ICD-10-CM

## 2019-06-28 ASSESSMENT — REFRACTION_CURRENTRX
OD_BASECURVE: 8.6
OD_BASECURVE: 8.6
OD_BRAND: BIOFINITY XR
OD_SPHERE: PLANO
OD_DIAMETER: 14.0
OD_DIAMETER: 14.5
OD_BRAND: ALDEN BLACK PUPIL
OD_SPHERE: PLANO
OD_DIAMETER: 14.2
OD_BASECURVE: 8.6
OD_DIAMETER: 14.5
OD_SPHERE: +20.00
OD_BASECURVE: 8.6
OD_BRAND: ALDEN BLACK PUPIL
OD_BRAND: PROCLEAR

## 2019-06-28 ASSESSMENT — VISUAL ACUITY
OD_SC: 20/70-2
OS_SC: 20/20
METHOD: SNELLEN - LINEAR

## 2019-06-28 ASSESSMENT — KERATOMETRY
METHOD_AUTO_MANUAL: TOPO
OD_AXISANGLE_DEGREES: 096
OD_K1POWER_DIOPTERS: 40.32
OD_AXISANGLE2_DEGREES: 006
OD_K2POWER_DIOPTERS: 42.99

## 2019-06-28 ASSESSMENT — SLIT LAMP EXAM - LIDS: COMMENTS: PTOSIS, MGD

## 2019-06-28 ASSESSMENT — EXTERNAL EXAM - RIGHT EYE: OD_EXAM: NORMAL

## 2019-06-28 NOTE — PROGRESS NOTES
A/P  1.) Binocular double vision  -Right eso/hypertropia, strab surgery not recommended  -Blurred vision right eye 2' to K scar. Previously tried scleral lens with I&R difficulty and still double vision  -Does not respond well to any blurred/occluder option today. Responded the best to +20.00 Proclear lens but still felt very off  -Did not like black occluder lens in any size pupil    Her double vision is intermittent and has largely improved since April. Given her mild symptoms she may not benefit from blurred CL. However, I did give her a +20.00 lens and Rx to try and adapt at home. If she is able to adapt to this okay to fill Rx. If double vision gets worse we may consider black occluder lens again but I would not recommend at this time

## 2019-09-23 ENCOUNTER — OFFICE VISIT (OUTPATIENT)
Dept: OPHTHALMOLOGY | Facility: CLINIC | Age: 74
End: 2019-09-23
Attending: OPHTHALMOLOGY
Payer: MEDICARE

## 2019-09-23 DIAGNOSIS — Z86.69 HISTORY OF RETINAL DETACHMENT: ICD-10-CM

## 2019-09-23 DIAGNOSIS — H35.373 EPIRETINAL MEMBRANE (ERM) OF BOTH EYES: Primary | ICD-10-CM

## 2019-09-23 DIAGNOSIS — H53.2 DOUBLE VISION: ICD-10-CM

## 2019-09-23 DIAGNOSIS — H17.9 CORNEAL SCAR, RIGHT EYE: ICD-10-CM

## 2019-09-23 DIAGNOSIS — H35.351 CYSTOID MACULAR EDEMA, RIGHT: ICD-10-CM

## 2019-09-23 DIAGNOSIS — H40.051 OCULAR HYPERTENSION, RIGHT EYE: ICD-10-CM

## 2019-09-23 PROCEDURE — 92134 CPTRZ OPH DX IMG PST SGM RTA: CPT | Mod: ZF | Performed by: OPHTHALMOLOGY

## 2019-09-23 PROCEDURE — G0463 HOSPITAL OUTPT CLINIC VISIT: HCPCS | Mod: ZF

## 2019-09-23 PROCEDURE — 92133 CPTRZD OPH DX IMG PST SGM ON: CPT | Mod: ZF | Performed by: OPHTHALMOLOGY

## 2019-09-23 RX ORDER — KETOROLAC TROMETHAMINE 5 MG/ML
1 SOLUTION OPHTHALMIC 2 TIMES DAILY
Qty: 10 ML | Refills: 6 | Status: SHIPPED | OUTPATIENT
Start: 2019-09-23 | End: 2021-04-29

## 2019-09-23 RX ORDER — DORZOLAMIDE HCL 20 MG/ML
1 SOLUTION/ DROPS OPHTHALMIC 2 TIMES DAILY
Qty: 10 ML | Refills: 11 | Status: SHIPPED | OUTPATIENT
Start: 2019-09-23 | End: 2021-04-29

## 2019-09-23 RX ORDER — KETOROLAC TROMETHAMINE 5 MG/ML
1 SOLUTION OPHTHALMIC 2 TIMES DAILY
COMMUNITY
End: 2019-09-23

## 2019-09-23 ASSESSMENT — TONOMETRY
OS_IOP_MMHG: 15
OD_IOP_MMHG: 23
OD_IOP_MMHG: 23
IOP_METHOD: TONOPEN
IOP_METHOD: TONOPEN

## 2019-09-23 ASSESSMENT — REFRACTION_WEARINGRX
OS_CYLINDER: +0.50
OD_SPHERE: PLANO
OS_AXIS: 105
OS_ADD: +2.50
OD_ADD: +2.50
OS_SPHERE: -0.75

## 2019-09-23 ASSESSMENT — SLIT LAMP EXAM - LIDS: COMMENTS: PTOSIS, MGD

## 2019-09-23 ASSESSMENT — CUP TO DISC RATIO
OD_RATIO: 0.4
OS_RATIO: 0.5

## 2019-09-23 ASSESSMENT — EXTERNAL EXAM - LEFT EYE: OS_EXAM: NORMAL

## 2019-09-23 ASSESSMENT — VISUAL ACUITY
OS_SC: 20/20
OS_SC+: -1
OD_SC: 20/70
CORRECTION_TYPE: GLASSES
OD_PH_SC: 20/50
METHOD: SNELLEN - LINEAR

## 2019-09-23 ASSESSMENT — EXTERNAL EXAM - RIGHT EYE: OD_EXAM: NORMAL

## 2019-09-23 ASSESSMENT — CONF VISUAL FIELD
OD_NORMAL: 1
METHOD: COUNTING FINGERS
OS_NORMAL: 1

## 2019-09-23 NOTE — LETTER
9/23/2019       RE: Annabelle Newsome  [address]  [facility] [address]     Dear MsErin Newsome:    Thank you for your visit to the EYE CLINIC at [facility]. Please see a copy of my visit note below and feel free to share with your other Eye Doctors.    CC: cystoid macular edema right eye     Interval history: Vision continues to fluctuate and double vision tolerable.    HPI: Patient of Dr. Maria, noted to have cystoid macular edema with Epiretinal membrane. Started and managed on topical Pred Forte and Nevanac and later Durezol and Ketorolac. Using both twice daily in the right eye and vision about the same. Not using contact lens as often    Health has been fine without hospitalizations or other issues.     MEDS:   Ketorolac twice daily right eye   Durezol twice daily right eye   Restasis BID each eye    Retinal Imaging (OCT Macula) September 23, 2019  right eye: ERM, intraretinal cysts improved with near normal contour. Thin choroid.  left eye: ERM, normal contour, no fluid. Thin choroid    [facility] RNFL September 23, 2019  right eye: Thin superotemporal and inferotemporal but small disc. Avg NFL Thickness 72 microns.   left eye: Thin superotemporal, borderline inferotemporal, but small disc. Avg NFL Thickness 77 microns    Assessment/plan:  1. Epiretinal Membrane, right eye  2. Cystoid Macular Edema Right Eye   - Epiretinal membrane noted on OCT   - confirmed by IVFA here. No polyps on ICG   - no improvement on nevanac alone, was changed to Durezol, ketorolac in [place]   - plan for membrane peel reviewed with Dr. Hurst, deferred last year    - Vision stable at 20/70, OCT stable ERM and few cysts improved vs 4/2019   - Continue drops right eye as currently using (Ketorolac, Durezol)    3. Ocular hypertension, right eye   - IOP 23 on multiple checks, likely secondary to long term Durezol for CME    - RNFL shows vertical thinning right eye, one area left eye with normal IOP   -  Discussed with pt. Elected to start Dorzolamide BID right eye only    4. Corneal scar right eye, stable  - Previous pt of Dr. Maria, Scleral lens as needed for vision but not currently wearing     5. H/o Retinal detachment  Both eyes   - early 2000s , details unknown, retina attached in both eyes on dilated fundus exam    6. 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 Will consider another appointment with Dr. Ellis 2020     Plan:  - Continue Durezol 2x/day right eye, Ketorolac 2x/day right eye, Restasis 2x/day each eye   - Start Dorzolamide 2x/day right eye only for slightly elevated eye pressure  - Recheck 3 months with Physicians in [place]. Might consider taper of Durezol or switch to Pred Acetate if CME stable/improved or IOP still elevated.  - Return 6 months here with [facility] OCT and RNFL after Winter in [place], will also consider Dr. Ellis at the same time.    Physician Attestation     Attending Physician Attestation:  Complete documentation of historical and exam elements from today's encounter can be found in the full encounter summary report (not reduplicated in this progress note). I personally obtained the chief complaint(s) and history of present illness. I confirmed and edited as necessary the review of systems, past medical/surgical history, family history, social history, and examination findings as documented by others; and I examined the patient myself. I personally reviewed the relevant tests, images, and reports as documented above. I formulated and edited as necessary the assessment and plan and discussed the findings and management plan with the patient and family. Pranay Choe M.D.      Sincerely,    Pranay Choe MD  [facility] Dept of Ophthalmology  Uveitis and Medical Retina

## 2019-09-23 NOTE — PATIENT INSTRUCTIONS
Continue Durezol 2x/day right eye and Ketorolac 2x/day right eye    Start Dorzolamide 2x/day in the right eye only    Continue Restasis twice a day in both eyes

## 2019-09-23 NOTE — NURSING NOTE
Chief Complaints and History of Present Illnesses   Patient presents with     Follow Up     Binocular double vision  -Right eso/hypertropia     Chief Complaint(s) and History of Present Illness(es)     Follow Up     Laterality: both eyes    Onset: gradual    Onset: years ago    Associated symptoms: dryness.  Negative for eye pain, redness and tearing    Treatments tried: eye drops and ointment    Response to treatment: moderate improvement    Pain scale: 0/10    Comments: Binocular double vision  -Right eso/hypertropia              Comments     Pt was last seen for binocular double vision and right eso/hypertropia. Hasn't been using the +20.00 D CL much lately. Uses ointment and AT at bedtime BE.     Mingo PETTY 12:21 PM September 23, 2019   CHATO Otto  September 23, 2019 2:46 PM

## 2019-09-23 NOTE — PROGRESS NOTES
CC: cystoid macular edema right eye     Interval history: Vision continues to fluctuate and double vision tolerable.    HPI: Patient of Dr. Maria, noted to have cystoid macular edema with Epiretinal membrane. Started and managed on topical Pred Forte and Nevanac and later Durezol and Ketorolac. Using both twice daily in the right eye and vision about the same. Not using contact lens as often    Health has been fine without hospitalizations or other issues.     MEDS:   Ketorolac twice daily right eye   Durezol twice daily right eye   Restasis BID each eye    Retinal Imaging (OCT Macula) September 23, 2019  right eye: ERM, intraretinal cysts improved with near normal contour. Thin choroid.  left eye: ERM, normal contour, no fluid. Thin choroid    [facility] RNFL September 23, 2019  right eye: Thin superotemporal and inferotemporal but small disc. Avg NFL Thickness 72 microns.   left eye: Thin superotemporal, borderline inferotemporal, but small disc. Avg NFL Thickness 77 microns    Assessment/plan:  1. Epiretinal Membrane, right eye  2. Cystoid Macular Edema Right Eye   - Epiretinal membrane noted on OCT   - confirmed by IVFA here. No polyps on ICG   - no improvement on nevanac alone, was changed to Durezol, ketorolac in [place]   - plan for membrane peel reviewed with Dr. Hurst, deferred last year    - Vision stable at 20/70, OCT stable ERM and few cysts improved vs 4/2019   - Continue drops right eye as currently using (Ketorolac, Durezol)    3. Ocular hypertension, right eye   - IOP 23 on multiple checks, likely secondary to long term Durezol for CME - RNFL shows vertical thinning right eye, one area left eye with normal IOP   - Discussed with pt. Elected to start Dorzolamide BID right eye only, no   Timolol as has asthma and no Latanoprost due to CME    4. Corneal scar right eye, stable  - Previous pt of Dr. Maria, Scleral lens as needed for vision but not currently wearing     5. H/o Retinal detachment  Both eyes    - early 2000s , details unknown, retina attached in both eyes on dilated fundus exam    6. 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 Will consider another appointment with Dr. Ellis 2020     Plan:  - Continue Durezol 2x/day right eye, Ketorolac 2x/day right eye, Restasis BID OU  - Start Dorzolamide 2x/day right eye only for slightly elevated eye pressure  - Recheck 3 months with [facility] in [place]. Might consider taper of Durezol or switch to Pred Acetate if CME stable/improved or IOP still elevated.  - Return 6 months here with [facility] OCT and RNFL after Winter in [place], will also consider Dr. Ellis at the same time.    Physician Attestation     Attending Physician Attestation:  Complete documentation of historical and exam elements from today's encounter can be found in the full encounter summary report (not reduplicated in this progress note). I personally obtained the chief complaint(s) and history of present illness. I confirmed and edited as necessary the review of systems, past medical/surgical history, family history, social history, and examination findings as documented by others; and I examined the patient myself. I personally reviewed the relevant tests, images, and reports as documented above. I formulated and edited as necessary the assessment and plan and discussed the findings and management plan with the patient and family.    Pranay Choe M.D., Uveitis and Medical Retina, September 23, 2019

## 2019-11-09 ENCOUNTER — HEALTH MAINTENANCE LETTER (OUTPATIENT)
Age: 74
End: 2019-11-09

## 2020-01-30 ENCOUNTER — TRANSFERRED RECORDS (OUTPATIENT)
Dept: HEALTH INFORMATION MANAGEMENT | Facility: CLINIC | Age: 75
End: 2020-01-30

## 2020-02-23 ENCOUNTER — HEALTH MAINTENANCE LETTER (OUTPATIENT)
Age: 75
End: 2020-02-23

## 2020-06-11 DIAGNOSIS — H35.351 CYSTOID MACULAR EDEMA, RIGHT: Primary | ICD-10-CM

## 2020-06-25 ENCOUNTER — OFFICE VISIT (OUTPATIENT)
Dept: OPHTHALMOLOGY | Facility: CLINIC | Age: 75
End: 2020-06-25
Attending: OPHTHALMOLOGY
Payer: MEDICARE

## 2020-06-25 DIAGNOSIS — H35.351 CYSTOID MACULAR EDEMA, RIGHT: ICD-10-CM

## 2020-06-25 PROCEDURE — G0463 HOSPITAL OUTPT CLINIC VISIT: HCPCS | Mod: ZF

## 2020-06-25 PROCEDURE — 92134 CPTRZ OPH DX IMG PST SGM RTA: CPT | Mod: ZF | Performed by: OPHTHALMOLOGY

## 2020-06-25 ASSESSMENT — CONF VISUAL FIELD
OS_NORMAL: 1
OD_NORMAL: 1
METHOD: COUNTING FINGERS

## 2020-06-25 ASSESSMENT — TONOMETRY
IOP_METHOD: TONOPEN
OD_IOP_MMHG: 16
OS_IOP_MMHG: 13

## 2020-06-25 ASSESSMENT — REFRACTION_WEARINGRX
OS_CYLINDER: +0.50
OD_ADD: +2.50
OS_ADD: +2.50
OS_AXIS: 105
OS_SPHERE: -0.75
OD_SPHERE: PLANO

## 2020-06-25 ASSESSMENT — VISUAL ACUITY
METHOD: SNELLEN - LINEAR
OS_SC: 20/20
OD_SC+: -1
OD_SC: 20/70

## 2020-06-25 ASSESSMENT — CUP TO DISC RATIO
OS_RATIO: 0.5
OD_RATIO: 0.4

## 2020-06-25 ASSESSMENT — EXTERNAL EXAM - LEFT EYE: OS_EXAM: NORMAL

## 2020-06-25 ASSESSMENT — EXTERNAL EXAM - RIGHT EYE: OD_EXAM: NORMAL

## 2020-06-25 ASSESSMENT — SLIT LAMP EXAM - LIDS: COMMENTS: PTOSIS, MGD

## 2020-06-25 NOTE — PROGRESS NOTES
I have confirmed the patient's and reviewed Past Medical History, Past Surgical History, Social History, Family History, Problem List, Medication List and agree with Tech note.    CC: cystoid macular edema right eye     Interval history: no change in vision right eye, fluctuations left eye, double vision more persistent.     HPI: patient of Dr. Maria, noted to have cystoid macular edema with Epiretinal membrane on visit in 09/2017,  Patient was in [place] over the Winter and was switched to Durezol and ketorolac this Winter and was seen by associated retina consultants in [place] this winter.      MEDS: OD  Ketorolac twice daily  Durezol twice daily   Dorzalamide daily    Assessment/plan:  1. Cystoid Macular Edema Right Eye   - Epiretinal membrane noted on OCT   - confirmed by IVFA again today.  No polyps on ICG   - no improvement on nevanac alone, was changed to durezol and ketorolac in [place]   - plan for membrane peal reviewed with Beulah Hurst MD and deferred last year    - Pinhole now improvement visual acuity right eye and recommend manifest refraction    - plan to continue drops right eye and taper durezol to once daily      2. Corneal scar right eye, stable  - follows with Dr. Maria, Scleral lens as needed for vision but not currently wearing    3. Thyroid eye disease, quiet  -  H/o multiple lid surgeries right eye      4. H/o Retinal detachment  Both eyes   - early 2000s , details unknown, retina attached    5. 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 Aayush Maria MD in June 2019 for corneal scar right eye   Jayy Ellis MD for double vision         Ramandeep Guzman MD PhD.  Professor & Chair

## 2020-06-25 NOTE — NURSING NOTE
Chief Complaints and History of Present Illnesses   Patient presents with     Epiretinal Membrane Follow Up     9 month follow up both eyes.       Chief Complaint(s) and History of Present Illness(es)     Epiretinal Membrane Follow Up     Laterality: both eyes    Comments: 9 month follow up both eyes.                Comments     Pt states vision is the same as last visit. No eye pain today.  No new flashes or floaters.    CHATO Otto June 25, 2020 7:06 AM

## 2020-07-02 ENCOUNTER — TELEPHONE (OUTPATIENT)
Dept: OPHTHALMOLOGY | Facility: CLINIC | Age: 75
End: 2020-07-02

## 2020-07-02 DIAGNOSIS — H04.129 DRY EYE: ICD-10-CM

## 2020-07-02 RX ORDER — CYCLOSPORINE 0.5 MG/ML
1 EMULSION OPHTHALMIC 2 TIMES DAILY
Qty: 60 EACH | Refills: 5 | Status: SHIPPED | OUTPATIENT
Start: 2020-07-02 | End: 2021-04-29

## 2020-07-02 NOTE — TELEPHONE ENCOUNTER
Spoke to pt at 1440  Pt has been trying to fill restasis thru [facility] and not been able to accomplish    Last filled by Dr. Maria    Reviewed able to send Rx under Dr. Choe who recommended continued use in sept 2019     Pt recently also seen by Dr. Dewitt this month    Rx sent    Alfredo Royal, RN 2:46 PM 07/02/20        [facility]    Phone Message    May a detailed message be left on voicemail: yes     Reason for Call: Other: Pt called to talk to Nurse about why her Glasses Rx was not accepted when she tried to get them. Pt would like a call back ASAP. I called the back line with no answer.  Please call Pt back, Thank you!     Action Taken: Message routed to:  [facility] EYE    Travel Screening: Not Applicable

## 2020-10-15 ENCOUNTER — OFFICE VISIT (OUTPATIENT)
Dept: OPHTHALMOLOGY | Facility: CLINIC | Age: 75
End: 2020-10-15
Attending: OPHTHALMOLOGY
Payer: MEDICARE

## 2020-10-15 DIAGNOSIS — H35.351 CYSTOID MACULAR EDEMA, RIGHT: Primary | ICD-10-CM

## 2020-10-15 DIAGNOSIS — H35.351 CYSTOID MACULAR EDEMA, RIGHT: ICD-10-CM

## 2020-10-15 PROCEDURE — 92014 COMPRE OPH EXAM EST PT 1/>: CPT | Performed by: OPHTHALMOLOGY

## 2020-10-15 PROCEDURE — 92134 CPTRZ OPH DX IMG PST SGM RTA: CPT | Performed by: OPHTHALMOLOGY

## 2020-10-15 PROCEDURE — G0463 HOSPITAL OUTPT CLINIC VISIT: HCPCS

## 2020-10-15 ASSESSMENT — TONOMETRY
OD_IOP_MMHG: 19
IOP_METHOD: TONOPEN
OS_IOP_MMHG: 13

## 2020-10-15 ASSESSMENT — SLIT LAMP EXAM - LIDS: COMMENTS: PTOSIS, MGD

## 2020-10-15 ASSESSMENT — VISUAL ACUITY
METHOD: SNELLEN - LINEAR
OD_PH_SC: 20/60
OD_SC: 20/70
OS_SC: 20/20

## 2020-10-15 ASSESSMENT — EXTERNAL EXAM - LEFT EYE: OS_EXAM: NORMAL

## 2020-10-15 ASSESSMENT — CUP TO DISC RATIO
OS_RATIO: 0.5
OD_RATIO: 0.4

## 2020-10-15 ASSESSMENT — EXTERNAL EXAM - RIGHT EYE: OD_EXAM: NORMAL

## 2020-10-15 ASSESSMENT — CONF VISUAL FIELD
OD_NORMAL: 1
METHOD: COUNTING FINGERS
OS_NORMAL: 1

## 2020-10-15 NOTE — PROGRESS NOTES
I have confirmed the patient's and reviewed Past Medical History, Past Surgical History, Social History, Family History, Problem List, Medication List and agree with Tech note.    CC: cystoid macular edema right eye, follow up     Interval history: no change in vision right eye, fluctuations left eye, double vision more persistent.     HPI: patient of Dr. Maria, noted to have cystoid macular edema with Epiretinal membrane on visit in 09/2017,  Patient was in [place] over the Winter and was switched to Durezol and ketorolac last Winter and was seen by associated retina consultants in [place] this winter.      MEDS: OD  Ketorolac twice daily  Durezol twice daily   Dorzalamide daily    Assessment/plan:  1. Cystoid Macular Edema Right Eye   - Epiretinal membrane noted on OCT   - confirmed by IVFA again today.  No polyps on ICG   - no improvement on nevanac alone, was changed to durezol and ketorolac in [place]   - plan for membrane peal reviewed with Beulah Hurst MD and deferred last year    - Pinhole now improvement visual acuity right eye and recommend manifest refraction    - plan to continue drops right eye and taper durezol to once daily      2. Corneal scar right eye, stable  - follows with Dr. Maria, Scleral lens as needed for vision but not currently wearing    3. Thyroid eye disease, quiet  -  H/o multiple lid surgeries right eye      4. H/o Retinal detachment  Both eyes   - early 2000s , details unknown, retina attached    5. 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 Marie in 6 months for corneal scar right eye         Ramandeep Guzman MD PhD.  Professor & Chair

## 2020-10-15 NOTE — NURSING NOTE
Chief Complaints and History of Present Illnesses   Patient presents with     Cystoid Macular Edema Follow Up     Chief Complaint(s) and History of Present Illness(es)     Cystoid Macular Edema Follow Up     Laterality: right eye    Onset: gradual    Onset: months ago    Quality: States va is the same since last visit      Associated symptoms: floaters and photophobia.  Negative for flashes    Pain scale: 0/10              Comments     Cystoid macular edema, right - Right Eye   Rashida Anuradha COT 7:14 AM October 15, 2020

## 2021-04-02 ENCOUNTER — TRANSFERRED RECORDS (OUTPATIENT)
Dept: HEALTH INFORMATION MANAGEMENT | Facility: CLINIC | Age: 76
End: 2021-04-02

## 2021-04-11 ENCOUNTER — HEALTH MAINTENANCE LETTER (OUTPATIENT)
Age: 76
End: 2021-04-11

## 2021-04-22 DIAGNOSIS — H35.351 CYSTOID MACULAR EDEMA, RIGHT: Primary | ICD-10-CM

## 2021-04-29 ENCOUNTER — OFFICE VISIT (OUTPATIENT)
Dept: OPHTHALMOLOGY | Facility: CLINIC | Age: 76
End: 2021-04-29
Attending: OPHTHALMOLOGY
Payer: MEDICARE

## 2021-04-29 DIAGNOSIS — H35.373 EPIRETINAL MEMBRANE (ERM), BILATERAL: ICD-10-CM

## 2021-04-29 DIAGNOSIS — H57.89 THYROID EYE DISEASE: ICD-10-CM

## 2021-04-29 DIAGNOSIS — H17.9 CORNEAL SCAR: Primary | ICD-10-CM

## 2021-04-29 DIAGNOSIS — H17.9 CORNEAL SCAR: ICD-10-CM

## 2021-04-29 DIAGNOSIS — H35.351 CYSTOID MACULAR EDEMA, RIGHT: ICD-10-CM

## 2021-04-29 DIAGNOSIS — E07.9 THYROID EYE DISEASE: ICD-10-CM

## 2021-04-29 DIAGNOSIS — H53.2 DOUBLE VISION: ICD-10-CM

## 2021-04-29 DIAGNOSIS — H04.129 DRY EYE: ICD-10-CM

## 2021-04-29 PROCEDURE — 92025 CPTRIZED CORNEAL TOPOGRAPHY: CPT | Performed by: OPHTHALMOLOGY

## 2021-04-29 PROCEDURE — 999N000103 HC STATISTIC NO CHARGE FACILITY FEE

## 2021-04-29 PROCEDURE — 99213 OFFICE O/P EST LOW 20 MIN: CPT | Mod: 25 | Performed by: OPHTHALMOLOGY

## 2021-04-29 PROCEDURE — 92134 CPTRZ OPH DX IMG PST SGM RTA: CPT | Performed by: OPHTHALMOLOGY

## 2021-04-29 PROCEDURE — 99214 OFFICE O/P EST MOD 30 MIN: CPT | Mod: GC | Performed by: OPHTHALMOLOGY

## 2021-04-29 PROCEDURE — G0463 HOSPITAL OUTPT CLINIC VISIT: HCPCS

## 2021-04-29 RX ORDER — CYCLOSPORINE 0.5 MG/ML
1 EMULSION OPHTHALMIC 2 TIMES DAILY
Qty: 60 EACH | Refills: 6 | Status: SHIPPED | OUTPATIENT
Start: 2021-04-29 | End: 2021-08-22

## 2021-04-29 RX ORDER — KETOROLAC TROMETHAMINE 5 MG/ML
1 SOLUTION OPHTHALMIC DAILY
Qty: 10 ML | Refills: 6 | Status: SHIPPED | OUTPATIENT
Start: 2021-04-29 | End: 2022-07-21

## 2021-04-29 ASSESSMENT — REFRACTION_WEARINGRX
OD_SPHERE: PLANO
OS_ADD: +2.50
OS_SPHERE: -0.75
OS_ADD: +2.50
OD_ADD: +2.50
OS_CYLINDER: +0.50
OD_SPHERE: PLANO
OS_SPHERE: -0.75
OD_ADD: +2.50
OS_AXIS: 105
OS_CYLINDER: +0.50
OS_AXIS: 105

## 2021-04-29 ASSESSMENT — TONOMETRY
OS_IOP_MMHG: 10
IOP_METHOD: TONOPEN
OD_IOP_MMHG: 15
OD_IOP_MMHG: 15
IOP_METHOD: TONOPEN
OS_IOP_MMHG: 10

## 2021-04-29 ASSESSMENT — VISUAL ACUITY
OS_SC+: +2
OS_SC: 20/20
OS_SC+: +2
METHOD: SNELLEN - LINEAR
OD_PH_SC+: -1
OD_SC+: -2
OD_SC: 20/70
OD_PH_SC+: -1
OD_SC: 20/70
METHOD: SNELLEN - LINEAR
OD_SC+: -2
OS_SC: 20/20
OD_PH_SC: 20/60
OD_PH_SC: 20/60

## 2021-04-29 ASSESSMENT — CONF VISUAL FIELD
METHOD: COUNTING FINGERS
OD_NORMAL: 1
OD_NORMAL: 1
METHOD: COUNTING FINGERS
OS_NORMAL: 1
OS_NORMAL: 1

## 2021-04-29 ASSESSMENT — EXTERNAL EXAM - LEFT EYE
OS_EXAM: NORMAL
OS_EXAM: NO PROPTOSIS OR SCLERAL SHOW

## 2021-04-29 ASSESSMENT — SLIT LAMP EXAM - LIDS: COMMENTS: PTOSIS, MGD

## 2021-04-29 ASSESSMENT — EXTERNAL EXAM - RIGHT EYE
OD_EXAM: NORMAL
OD_EXAM: NO PROPTOSIS OR SCLERAL SHOW

## 2021-04-29 ASSESSMENT — CUP TO DISC RATIO
OD_RATIO: 0.4
OS_RATIO: 0.5

## 2021-04-29 NOTE — NURSING NOTE
Chief Complaints and History of Present Illnesses   Patient presents with     Cystoid Macular Edema Follow Up     6 month follow up both eyes.     Chief Complaint(s) and History of Present Illness(es)     Cystoid Macular Edema Follow Up     Comments: 6 month follow up both eyes.              Comments     Pt states vision is the same as last visit. No eye pain today.  Occasional floater in LE, comes and goes. No flashes.  Pt still seeing double vision up/down, no changes.    CHATO Otto April 29, 2021 7:15 AM

## 2021-04-29 NOTE — PROGRESS NOTES
Annabelle Newsome is a 75 year old female presenting for 1 year  follow up.      Interval history:  Vision is stable. No new flashes, floaters, or diplopia. No pain, redness, or tearing.      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 Corneal scar right eye, stable   -approx 20% thinning - stable   -continue frequent preservative-free tears   - reviewed surgical options would be DALK vs PKP. Scar is too deep for PTK (~40% ?). Patient doing well at this time and would like to continue observation.   - K transplant may make binocular diplopia worse, but would likely help monocular diplopia/glare    #. Binocular diplopia - longstanding  - Right esotropia, strab surgery not recommended  - Dr. Arceo recommended trial of blurred +20 CL versus black occluder lens, patient did not find it helpful and is no longer wearing it    # Dry eye, doing well -stable   -continue restasis BID OU   -continue PF tears q~2 hours each eye    #. Thyroid eye disease, quiet   H/o multiple lid surgeries right eye    -last visit with Caleb - decided not to pursue any surgery at this time    #. H/o Retinal detachment  Both eyes    -reviewed RT/RD precautions    # Hx of CME, right eye  - follows w/ Dr. Dewitt  # ERM  - non-surgical per retina team    # Functionally monocular  Reviewed monocular precautions.      F/u cornea 1 year, sooner PRN    Yamileth Alberts MD  Cornea & External Disease Fellow    Attending Physician Attestation:  Complete documentation of historical and exam elements from today's  encounter can be found in the full encounter summary report (not reduplicated in this progress note).  I personally obtained the chief complaint(s) and history of present illness.  I confirmed and edited as necessary the review of systems, past medical/surgical history, family history, social history, and examination findings as documented by others; and I examined the patient myself.  I personally reviewed the relevant tests, images, and reports as documented above.  I formulated and edited as necessary the assessment and plan and discussed the findings and management plan with the patient and family. - Delfino Silverio MD

## 2021-04-29 NOTE — PROGRESS NOTES
I have confirmed the patient's and reviewed Past Medical History, Past Surgical History, Social History, Family History, Problem List, Medication List and agree with Tech note.    CC: cystoid macular edema right eye, follow up     Interval history: no change in vision right eye, fluctuations left eye, double vision more persistent.     HPI: patient of Dr. Maria, noted to have cystoid macular edema with Epiretinal membrane on visit in 09/2017,  Patient was in [place] over the Winter and was switched to Durezol and ketorolac last Winter and was seen by associated retina consultants in [place] this winter.      MEDS: OD  Ketorolac twice daily changed to once a day   Durezol twice daily  STOP  Dorzalamide daily STOP    Assessment/plan:  1. Cystoid Macular Edema Right Eye   - Epiretinal membrane noted on OCT, also on left eye    - confirmed by IVFA again today.  No polyps on ICG   - Pinhole now improvement visual acuity right eye and recommend manifest refraction    - plan to continue drops right eye and stop durezol and dorzalamide     2. Corneal scar right eye, stable  - follows with Dr. Maria, Scleral lens as needed for vision but not currently wearing    3. Thyroid eye disease, quiet  -  H/o multiple lid surgeries right eye      4. H/o Retinal detachment  Both eyes   - early 2000s , details unknown, retina attached    5. 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 Marie in 6 months for corneal scar right eye         Ramandeep Guzman MD PhD.  Professor & Chair

## 2021-04-29 NOTE — NURSING NOTE
Chief Complaints and History of Present Illnesses   Patient presents with     Follow Up     2 year follow up Corneal scar right eye, stable     Chief Complaint(s) and History of Present Illness(es)     Follow Up     Comments: 2 year follow up Corneal scar right eye, stable              Comments     Pt states vision is the same as last visit. No eye pain today.  Occasional floater in LE, comes and goes. No flashes.  Pt still seeing double vision up/down, no changes.    CHATO Otto April 29, 2021 7:15 AM

## 2021-08-09 ENCOUNTER — HOSPITAL ENCOUNTER (OUTPATIENT)
Dept: GENERAL RADIOLOGY | Facility: OTHER | Age: 76
End: 2021-08-09
Attending: NURSE PRACTITIONER
Payer: MEDICARE

## 2021-08-09 ENCOUNTER — OFFICE VISIT (OUTPATIENT)
Dept: FAMILY MEDICINE | Facility: OTHER | Age: 76
End: 2021-08-09
Attending: PHYSICIAN ASSISTANT
Payer: MEDICARE

## 2021-08-09 VITALS
HEART RATE: 68 BPM | RESPIRATION RATE: 16 BRPM | TEMPERATURE: 97.4 F | DIASTOLIC BLOOD PRESSURE: 88 MMHG | HEIGHT: 65 IN | WEIGHT: 111.3 LBS | SYSTOLIC BLOOD PRESSURE: 156 MMHG | BODY MASS INDEX: 18.54 KG/M2

## 2021-08-09 DIAGNOSIS — S22.31XA CLOSED FRACTURE OF ONE RIB OF RIGHT SIDE, INITIAL ENCOUNTER: ICD-10-CM

## 2021-08-09 DIAGNOSIS — J93.9 PNEUMOTHORAX, RIGHT: Primary | ICD-10-CM
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
## 2021-08-09 DIAGNOSIS — R07.81 RIB PAIN ON RIGHT SIDE: ICD-10-CM

## 2021-08-09 LAB
ALBUMIN SERPL-MCNC: 4.4 G/DL (ref 3.5–5.7)
ALP SERPL-CCNC: 61 U/L (ref 34–104)
ALT SERPL W P-5'-P-CCNC: 20 U/L (ref 7–52)
ANION GAP SERPL CALCULATED.3IONS-SCNC: 8 MMOL/L (ref 3–14)
AST SERPL W P-5'-P-CCNC: 30 U/L (ref 13–39)
BASOPHILS # BLD AUTO: 0.1 10E3/UL (ref 0–0.2)
BASOPHILS NFR BLD AUTO: 1 %
BILIRUB SERPL-MCNC: 0.3 MG/DL (ref 0.3–1)
BUN SERPL-MCNC: 15 MG/DL (ref 7–25)
CALCIUM SERPL-MCNC: 10.2 MG/DL (ref 8.6–10.3)
CHLORIDE BLD-SCNC: 100 MMOL/L (ref 98–107)
CO2 SERPL-SCNC: 30 MMOL/L (ref 21–31)
CREAT SERPL-MCNC: 0.8 MG/DL (ref 0.6–1.2)
EOSINOPHIL # BLD AUTO: 0.1 10E3/UL (ref 0–0.7)
EOSINOPHIL NFR BLD AUTO: 2 %
ERYTHROCYTE [DISTWIDTH] IN BLOOD BY AUTOMATED COUNT: 14.1 % (ref 10–15)
GFR SERPL CREATININE-BSD FRML MDRD: 72 ML/MIN/1.73M2
GLUCOSE BLD-MCNC: 110 MG/DL (ref 70–105)
HCT VFR BLD AUTO: 40.3 % (ref 35–47)
HGB BLD-MCNC: 13 G/DL (ref 11.7–15.7)
IMM GRANULOCYTES # BLD: 0 10E3/UL
IMM GRANULOCYTES NFR BLD: 0 %
LYMPHOCYTES # BLD AUTO: 1.2 10E3/UL (ref 0.8–5.3)
LYMPHOCYTES NFR BLD AUTO: 16 %
MCH RBC QN AUTO: 27.1 PG (ref 26.5–33)
MCHC RBC AUTO-ENTMCNC: 32.3 G/DL (ref 31.5–36.5)
MCV RBC AUTO: 84 FL (ref 78–100)
MONOCYTES # BLD AUTO: 0.5 10E3/UL (ref 0–1.3)
MONOCYTES NFR BLD AUTO: 8 %
NEUTROPHILS # BLD AUTO: 5.3 10E3/UL (ref 1.6–8.3)
NEUTROPHILS NFR BLD AUTO: 73 %
NRBC # BLD AUTO: 0 10E3/UL
NRBC BLD AUTO-RTO: 0 /100
PLATELET # BLD AUTO: 319 10E3/UL (ref 150–450)
POTASSIUM BLD-SCNC: 3.8 MMOL/L (ref 3.5–5.1)
PROT SERPL-MCNC: 7.9 G/DL (ref 6.4–8.9)
RBC # BLD AUTO: 4.79 10E6/UL (ref 3.8–5.2)
SODIUM SERPL-SCNC: 138 MMOL/L (ref 134–144)
WBC # BLD AUTO: 7.2 10E3/UL (ref 4–11)

## 2021-08-09 PROCEDURE — 36415 COLL VENOUS BLD VENIPUNCTURE: CPT | Mod: ZL | Performed by: NURSE PRACTITIONER

## 2021-08-09 PROCEDURE — G0463 HOSPITAL OUTPT CLINIC VISIT: HCPCS

## 2021-08-09 PROCEDURE — 99204 OFFICE O/P NEW MOD 45 MIN: CPT | Performed by: NURSE PRACTITIONER

## 2021-08-09 PROCEDURE — 85025 COMPLETE CBC W/AUTO DIFF WBC: CPT | Mod: ZL | Performed by: NURSE PRACTITIONER

## 2021-08-09 PROCEDURE — 71101 X-RAY EXAM UNILAT RIBS/CHEST: CPT | Mod: RT

## 2021-08-09 PROCEDURE — 80053 COMPREHEN METABOLIC PANEL: CPT | Mod: ZL | Performed by: NURSE PRACTITIONER

## 2021-08-09 ASSESSMENT — PAIN SCALES - GENERAL: PAINLEVEL: EXTREME PAIN (8)

## 2021-08-09 ASSESSMENT — MIFFLIN-ST. JEOR: SCORE: 1000.73

## 2021-08-09 NOTE — PATIENT INSTRUCTIONS
Tylenol with codeine every 6 hours as needed for moderate to severe pain    Use incentive spirometer 5 to 10 times ever hour    Go to ER if difficulty breathing    Follow up in clinic as scheduled on Tuesday at 2pm
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
## 2021-08-09 NOTE — PROGRESS NOTES
"Answers for HPI/ROS submitted by the patient on 8/10/2021  If you checked off any problems, how difficult have these problems made it for you to do your work, take care of things at home, or get along with other people?: Not difficult at all  PHQ9 TOTAL SCORE: 0    Annabelle Newsome  : 1945 Age: 75 year old Sex: female MRN: [id number]    CC:   Chief Complaint   Patient presents with     RECHECK     Follow up for pnuemothorax, rib fracture     KATIE Score:    No flowsheet data found.    PHQ-2 Score:     PHQ-2 (  [employer]) 2021   Q1: Little interest or pleasure in doing things 0 0   Q2: Feeling down, depressed or hopeless 0 0   PHQ-2 Score 0 0         Tobacco Use      Smoking status: Never Smoker      Smokeless tobacco: Never Used    NURSE'S NOTES:    Nursing Notes:   Rachel Foley LPN  8/10/2021  2:38 PM  Signed  Chief Complaint   Patient presents with     RECHECK     Follow up for pnuemothorax, rib fracture     Patient presents for follow up from , from a fall in boat, where she fractured a rib and also has a pneumothorax. Pain is 8/10.    Initial /78 (BP Location: Left arm, Patient Position: Sitting, Cuff Size: Adult Regular)   Temp 97.7  F (36.5  C) (Tympanic)   Resp 16   Ht 1.651 m (5' 5\")   Wt 51.7 kg (114 lb)   LMP  (LMP Unknown)   Breastfeeding No   BMI 18.97 kg/m   Estimated body mass index is 18.97 kg/m  as calculated from the following:    Height as of this encounter: 1.651 m (5' 5\").    Weight as of this encounter: 51.7 kg (114 lb).  Medication Reconciliation: complete  FOOD SECURITY SCREENING QUESTIONS  Hunger Vital Signs:  Within the past 12 months we worried whether our food would run out before we got money to buy more. Never  Within the past 12 months the food we bought just didn't last and we didn't have money to get more. Never      Rachel Foley LPN         Nursing note reviewed with patient.  Accuracy and completeness verified.      SUBJECTIVE:               "                                        HPI:   Annabelle Newsome presents to the clinic today for follow-up on a rib fracture and pneumothorax.  She was seen in [facility] clinic yesterday.  She sustained a fall out of a boat onto the motor on 8/8.  Patient did not seek medical care until yesterday.  Imaging was obtained, [facility] provider consulted with trauma team.  She was found to have a right ninth rib fracture and right apical pneumothorax.  She was instructed to follow-up in clinic today for reevaluation of x-ray.    Today she reports her pain is 8 out of 10 in the mid thoracic posterior.  She does report mild shortness of breath however reports this is no change from prior.  She has been using her incentive spirometry given to her in [facility] clinic yesterday, reports she can get it up to 1250.  She has been icing her back.  She has been using the Tylenol 3 for discomfort, has only taken 3 pills so far but feels that this is working to manage her pain.      Annabelle Newsome also presents with:    Patient Active Problem List   Diagnosis     Corneal opacity     Thyroid eye disease     Cystoid macular edema, right - Right Eye     Double vision     History of retinal detachment     Epiretinal membrane (ERM) of both eyes     Ocular hypertension, right eye       Current Code Status:  No Order    REVIEW OF SYSTEMS:    Review of Systems   Constitutional: Positive for activity change (due to pain and injury). Negative for chills and fever.   Respiratory: Positive for shortness of breath (due to pain). Negative for cough and chest tightness.    Cardiovascular: Negative for chest pain.   Musculoskeletal: Positive for back pain (Right posterior apical).   Skin: Positive for color change (Bruising has started on her back, she has a very large bruise on the inner upper right arm and right elbow).   All other systems reviewed and are negative.      Problem List/PMH: Reviewed in EMR, and made relevant updates  "today.  Medications: Reviewed in EMR, and made relevant updates today.  Allergies: Reviewed in EMR, and made relevant updates today.    OBJECTIVE:                                                      /78 (BP Location: Left arm, Patient Position: Sitting, Cuff Size: Adult Regular)   Temp 97.7  F (36.5  C) (Tympanic)   Resp 16   Ht 1.651 m (5' 5\")   Wt 51.7 kg (114 lb)   LMP  (LMP Unknown)   Breastfeeding No   BMI 18.97 kg/m      Current Pain Score:   Extreme Pain (8)     Physical Exam  Vitals and nursing note reviewed.   Constitutional:       Appearance: Normal appearance.   HENT:      Head: Normocephalic and atraumatic.   Cardiovascular:      Rate and Rhythm: Normal rate and regular rhythm.      Heart sounds: Normal heart sounds.   Pulmonary:      Effort: Pulmonary effort is normal.      Breath sounds: Normal breath sounds.   Chest:      Chest wall: Swelling, tenderness and crepitus present.      Breasts:         Right: Tenderness present.         Musculoskeletal:         General: Swelling, tenderness and signs of injury present.        Arms:    Skin:     General: Skin is warm and dry.      Findings: Bruising (Right inner upper arm, right elbow, thoracic right posterior) present.   Neurological:      Mental Status: She is alert and oriented to person, place, and time. Mental status is at baseline.   Psychiatric:         Mood and Affect: Mood normal.         Behavior: Behavior normal.         Thought Content: Thought content normal.         Judgment: Judgment normal.          BP Readings from Last 3 Encounters:   08/09/21 (!) 156/88        Vitals:    08/10/21 1407   Weight: 51.7 kg (114 lb)        The ASCVD Risk score (Denisse JESSIE Carpio., et al., 2013) failed to calculate for the following reasons:    Cannot find a previous HDL lab    Cannot find a previous total cholesterol lab    Diagnostics Completed at this Visit:    Results for orders placed or performed during the hospital encounter of 08/10/21   XR Ribs " & Chest Lt 3v     Status: None    Narrative    PROCEDURE:  XR RIBS & CHEST LT 3VW    HISTORY: Traumatic pneumothorax, subsequent encounter, .    COMPARISON:  8/9/2021    FINDINGS:  The cardiomediastinal contours are stable. The trachea is midline.  A right apical pneumothorax is stable to slightly smaller.  Subcutaneous emphysema persists.    A right ninth rib fracture is redemonstrated.      Impression    IMPRESSION:      Stable to slightly smaller right apical pneumothorax following right  ninth rib fracture.      TAMANNA EDDY MD         SYSTEM ID:  [id number]        ASSESSMENT AND PLAN:    Fall, subsequent encounter  Patient also had a fall couple weeks ago while she was pushing a wheelbarrow full of rocks and fell on her backside.  Her left buttock is very painful.  She declined offer of imaging her left hip/pelvis today but wishes to see sports medicine as discussed in [facility].  Order placed.  - Orthopedic  Referral    Rib pain  Traumatic pneumothorax, subsequent encounter  Per x-ray, stable to slightly smaller right apical pneumothorax, right ninth rib fracture.  Patient is requesting to use Advil in addition to Tylenol 3 for discomfort.  Instructed her to stagger these and try to use Advil more often during the day.  Continue to use ice for today.  Tomorrow she may start using ice and heat in alteration if that makes her pain better.  She was given strict instructions to follow-up in ED should she become acutely short of breath, pain worsened or she starts to run a fever.  - XR Ribs & Chest Lt 3v    Left buttock pain  From prior fall  - Orthopedic  Referral       I explained my diagnostic considerations and recommendations to the patient, who voiced understanding and agreement with the treatment plan. All questions were answered. We discussed potential side effects of any prescribed or recommended therapies, as well as expectations for response to treatments.    Patient was advised  to allow up to 2 days for response on lab results via MyChart and or letter to be sent.    FOLLOW-UP:    Return if symptoms worsen or fail to improve.   Sports Med will call you with appointment.    Clinic : [phone]  Appointment line: [phone]     DEYVI Falcon, AGNP-C  Internal Medicine  08/10/2021 3:07 PM  _____________________________________________________________________________________    Total time spent with this patient was 27 minutes which included chart review, visualization and interpretation of labs and/or images, time spent with patient, and documentation.    PATIENT INSTRUCTIONS:    Patient Instructions   Follow up appointment with Dr. Felipe Jain for left buttock pain and any imaging if needed.

## 2021-08-10 ENCOUNTER — HOSPITAL ENCOUNTER (OUTPATIENT)
Dept: GENERAL RADIOLOGY | Facility: OTHER | Age: 76
End: 2021-08-10
Attending: NURSE PRACTITIONER
Payer: MEDICARE

## 2021-08-10 ENCOUNTER — OFFICE VISIT (OUTPATIENT)
Dept: INTERNAL MEDICINE | Facility: OTHER | Age: 76
End: 2021-08-10
Attending: NURSE PRACTITIONER
Payer: MEDICARE

## 2021-08-10 VITALS
WEIGHT: 114 LBS | HEIGHT: 65 IN | DIASTOLIC BLOOD PRESSURE: 78 MMHG | SYSTOLIC BLOOD PRESSURE: 130 MMHG | RESPIRATION RATE: 16 BRPM | TEMPERATURE: 97.7 F | BODY MASS INDEX: 18.99 KG/M2

## 2021-08-10 DIAGNOSIS — M79.18 LEFT BUTTOCK PAIN: ICD-10-CM
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
## 2021-08-10 DIAGNOSIS — S27.0XXD TRAUMATIC PNEUMOTHORAX, SUBSEQUENT ENCOUNTER: ICD-10-CM

## 2021-08-10 DIAGNOSIS — S27.0XXD TRAUMATIC PNEUMOTHORAX, SUBSEQUENT ENCOUNTER: Primary | ICD-10-CM

## 2021-08-10 DIAGNOSIS — R07.81 RIB PAIN: ICD-10-CM

## 2021-08-10 PROCEDURE — G0463 HOSPITAL OUTPT CLINIC VISIT: HCPCS

## 2021-08-10 PROCEDURE — 99213 OFFICE O/P EST LOW 20 MIN: CPT | Performed by: NURSE PRACTITIONER

## 2021-08-10 PROCEDURE — G0463 HOSPITAL OUTPT CLINIC VISIT: HCPCS | Mod: 25

## 2021-08-10 PROCEDURE — 71101 X-RAY EXAM UNILAT RIBS/CHEST: CPT | Mod: LT

## 2021-08-10 ASSESSMENT — ENCOUNTER SYMPTOMS
CHILLS: 0
COLOR CHANGE: 1
CHEST TIGHTNESS: 0
FEVER: 0
BACK PAIN: 1
ACTIVITY CHANGE: 1
COUGH: 0
SHORTNESS OF BREATH: 1

## 2021-08-10 ASSESSMENT — MIFFLIN-ST. JEOR: SCORE: 1012.98

## 2021-08-10 ASSESSMENT — PATIENT HEALTH QUESTIONNAIRE - PHQ9
SUM OF ALL RESPONSES TO PHQ QUESTIONS 1-9: 0
SUM OF ALL RESPONSES TO PHQ QUESTIONS 1-9: 0
10. IF YOU CHECKED OFF ANY PROBLEMS, HOW DIFFICULT HAVE THESE PROBLEMS MADE IT FOR YOU TO DO YOUR WORK, TAKE CARE OF THINGS AT HOME, OR GET ALONG WITH OTHER PEOPLE: NOT DIFFICULT AT ALL

## 2021-08-10 ASSESSMENT — PAIN SCALES - GENERAL: PAINLEVEL: EXTREME PAIN (8)

## 2021-08-10 NOTE — NURSING NOTE
"Chief Complaint   Patient presents with     RECHECK     Follow up for pnuemothorax, rib fracture     Patient presents for follow up from , from a fall in boat, where she fractured a rib and also has a pneumothorax. Pain is 8/10.    Initial /78 (BP Location: Left arm, Patient Position: Sitting, Cuff Size: Adult Regular)   Temp 97.7  F (36.5  C) (Tympanic)   Resp 16   Ht 1.651 m (5' 5\")   Wt 51.7 kg (114 lb)   LMP  (LMP Unknown)   Breastfeeding No   BMI 18.97 kg/m   Estimated body mass index is 18.97 kg/m  as calculated from the following:    Height as of this encounter: 1.651 m (5' 5\").    Weight as of this encounter: 51.7 kg (114 lb).  Medication Reconciliation: complete  FOOD SECURITY SCREENING QUESTIONS  Hunger Vital Signs:  Within the past 12 months we worried whether our food would run out before we got money to buy more. Never  Within the past 12 months the food we bought just didn't last and we didn't have money to get more. Never      Rachel Foley, ALBERTO    "

## 2021-08-11 ASSESSMENT — PATIENT HEALTH QUESTIONNAIRE - PHQ9: SUM OF ALL RESPONSES TO PHQ QUESTIONS 1-9: 0

## 2021-08-22 DIAGNOSIS — H04.129 DRY EYE: ICD-10-CM

## 2021-08-22 RX ORDER — CYCLOSPORINE 0.5 MG/ML
1 EMULSION OPHTHALMIC 2 TIMES DAILY
Qty: 60 EACH | Refills: 1 | Status: SHIPPED | OUTPATIENT
Start: 2021-08-22 | End: 2022-07-21

## 2021-08-22 NOTE — TELEPHONE ENCOUNTER
cycloSPORINE (RESTASIS) 0.05 % ophthalmic emulsion    Last Written Prescription Date: 4/29/21  Last Fill Quantity: 60,   # refills: 6  Last Office Visit : 4/29/21  Future Office visit: 10/14/21    Routing refill request to provider for review/approval because: not mentioned in last note to continue. rf?

## 2021-08-31 ENCOUNTER — HOSPITAL ENCOUNTER (OUTPATIENT)
Dept: GENERAL RADIOLOGY | Facility: OTHER | Age: 76
End: 2021-08-31
Attending: FAMILY MEDICINE
Payer: MEDICARE

## 2021-08-31 ENCOUNTER — OFFICE VISIT (OUTPATIENT)
Dept: FAMILY MEDICINE | Facility: OTHER | Age: 76
End: 2021-08-31
Attending: NURSE PRACTITIONER
Payer: MEDICARE

## 2021-08-31 VITALS
HEART RATE: 60 BPM | WEIGHT: 111.4 LBS | SYSTOLIC BLOOD PRESSURE: 134 MMHG | RESPIRATION RATE: 16 BRPM | HEIGHT: 64 IN | BODY MASS INDEX: 19.02 KG/M2 | OXYGEN SATURATION: 94 % | DIASTOLIC BLOOD PRESSURE: 82 MMHG | TEMPERATURE: 96.7 F

## 2021-08-31 DIAGNOSIS — S76.012A STRAIN OF BUTTOCK, LEFT, INITIAL ENCOUNTER: Primary | ICD-10-CM

## 2021-08-31 PROCEDURE — 99214 OFFICE O/P EST MOD 30 MIN: CPT | Performed by: FAMILY MEDICINE

## 2021-08-31 PROCEDURE — G0463 HOSPITAL OUTPT CLINIC VISIT: HCPCS

## 2021-08-31 PROCEDURE — 73502 X-RAY EXAM HIP UNI 2-3 VIEWS: CPT

## 2021-08-31 PROCEDURE — G0463 HOSPITAL OUTPT CLINIC VISIT: HCPCS | Mod: 25

## 2021-08-31 RX ORDER — IBUPROFEN 200 MG
200 TABLET ORAL EVERY 4 HOURS PRN
COMMUNITY
End: 2024-07-06

## 2021-08-31 ASSESSMENT — MIFFLIN-ST. JEOR: SCORE: 985.31

## 2021-08-31 ASSESSMENT — PAIN SCALES - GENERAL: PAINLEVEL: NO PAIN (1)

## 2021-08-31 NOTE — NURSING NOTE
"Chief Complaint   Patient presents with     Injury     left hip injury/pain, injured from a fall, pain 1/10 can get up to a 10/10, DOI: 6/21/21     Patient presents to clinic today for left hip injury/pain. She injured from a fall on 6/21/21. She fell while wheeling a wheel [place] full of rock. She states that she did have pain before the fall but it has gotten worse since falling. Pain 1/10 today but can get up to a 10/10 with prolonged sitting like in the car.     Initial /82 (BP Location: Right arm, Patient Position: Sitting, Cuff Size: Adult Regular)   Pulse 60   Temp (!) 96.7  F (35.9  C) (Tympanic)   Resp 16   Ht 1.626 m (5' 4\")   Wt 50.5 kg (111 lb 6.4 oz)   LMP  (LMP Unknown)   SpO2 94%   BMI 19.12 kg/m   Estimated body mass index is 19.12 kg/m  as calculated from the following:    Height as of this encounter: 1.626 m (5' 4\").    Weight as of this encounter: 50.5 kg (111 lb 6.4 oz).     FOOD SECURITY SCREENING QUESTIONS  Hunger Vital Signs:  Within the past 12 months we worried whether our food would run out before we got money to buy more. Never  Within the past 12 months the food we bought just didn't last and we didn't have money to get more. Never      Medication Reconciliation: Complete      [place]awnee Pascual, ALBERTO   "

## 2021-08-31 NOTE — PROGRESS NOTES
"HPI:  75-year-old female coming in for evaluation of left-sided buttock pain.  Her pain has been present intermittently for several years.  Her pain worsened while she was doing yard work and trying to push a heavy wheelbarrow in June 2021.  She suffered a fall during this injury.  Now she continues to have pain in the left buttock.  She characterizes this pain as sharp.  Her pain is most bothersome when sitting and is alleviated by standing.  Her pain comes on gradually with sitting and becomes more bothersome the more she sits.  She has tried stretching, heat, and ice.  She feels like her previous intermittent pain was more lateral, in the area of the posterior greater trochanter.      EXAM:  /82 (BP Location: Right arm, Patient Position: Sitting, Cuff Size: Adult Regular)   Pulse 60   Temp (!) 96.7  F (35.9  C) (Tympanic)   Resp 16   Ht 1.626 m (5' 4\")   Wt 50.5 kg (111 lb 6.4 oz)   LMP  (LMP Unknown)   SpO2 94%   BMI 19.12 kg/m    MUSCULOSKELETAL EXAM:  LEFT HIP  Inspection:  -No gross deformity    Tenderness to palpation of the:  -Greater trochanter:  Negative  -Pelvic ala:  Negative  -Gluteus medius/minimus tendinous insertion:  Negative  -Lumbar spinous processes:  Negative  -Left SI joint:  Negative  -Right SI joint:  Negative  -Gluteal musculature/piriformis: Positive  -Proximal hamstring origin:  Negative    Range of Motion:  -Passive flexion:  125  -Passive external rotation:  55  -Passive internal rotation:  20    Strength:  -Knee extension:  5/5  -Knee flexion:  5/5  -Hip abduction:  5/5  -Hip adduction:  5/5  -Hip flexion:  5/5  -Hip extension:  5/5    Special Tests:  -Logrolling maneuver:  Negative  -JESSICA:  Negative  -FADIR:  Negative  -Scour test:  Negative  -Stinchfield test:  Negative    Other:  -Intact sensation to light touch distally.  -No signs of cyanosis. Normal skin temperature of the lower extremity.  -Knee/foot/ankle:  No gross deformity. Full range of motion.  -Right hip:  " No gross deformity. No palpable tenderness. Normal strength and ROM.      IMAGIN2021: Pelvis and 2 view left hip x-ray  -Mild degenerative changes in the bilateral femoroacetabular joints.  No acute fractures.      ASSESSMENT/PLAN:  Diagnoses and all orders for this visit:  Strain of buttock, left, initial encounter  -     Orthopedic  Referral  -     XR Pelvis w Hip Left G/E 2 Views  -     Physical Therapy Referral; Future    75-year-old female with pain in the left buttocks.  Based on exam, her pain seems to be more muscular in nature.  Consider the possibility of gluteus annabel versus piriformis given the location of maximal tenderness.  This is typically an injury that resolves with time and conservative interventions.  Pelvis and 2 view left hip x-ray were performed in the office today and personally reviewed by me with the findings as demonstrated above by my interpretation.  -Referral placed to physical therapy  -OTC pain relieving medications as needed  -Activities as tolerated  -Follow-up in 6 weeks as needed      Felipe Jain MD  2021  10:23 AM    Total time spent with this patient was 34 minutes which included chart review, visualization and interpretation of images, time spent with the patient, and documentation.

## 2021-09-23 DIAGNOSIS — H35.351 CYSTOID MACULAR EDEMA, RIGHT: Primary | ICD-10-CM

## 2021-09-26 ENCOUNTER — HEALTH MAINTENANCE LETTER (OUTPATIENT)
Age: 76
End: 2021-09-26

## 2021-10-01 ENCOUNTER — HOSPITAL ENCOUNTER (OUTPATIENT)
Dept: PHYSICAL THERAPY | Facility: OTHER | Age: 76
Setting detail: THERAPIES SERIES
End: 2021-10-01
Attending: FAMILY MEDICINE
Payer: MEDICARE

## 2021-10-01 DIAGNOSIS — S76.012A STRAIN OF BUTTOCK, LEFT, INITIAL ENCOUNTER: ICD-10-CM

## 2021-10-01 PROCEDURE — 97035 APP MDLTY 1+ULTRASOUND EA 15: CPT | Mod: GP

## 2021-10-01 PROCEDURE — 97161 PT EVAL LOW COMPLEX 20 MIN: CPT | Mod: GP

## 2021-10-01 PROCEDURE — 97110 THERAPEUTIC EXERCISES: CPT | Mod: GP

## 2021-10-04 ENCOUNTER — MYC MEDICAL ADVICE (OUTPATIENT)
Dept: FAMILY MEDICINE | Facility: OTHER | Age: 76
End: 2021-10-04

## 2021-10-04 NOTE — PROGRESS NOTES
[facility]          OUTPATIENT PHYSICAL THERAPY ORTHOPEDIC EVALUATION  PLAN OF TREATMENT FOR OUTPATIENT REHABILITATION  (COMPLETE FOR INITIAL CLAIMS ONLY)  Patient's Last Name, First Name, M.I.  YOB: 1945  JerodAnnabelle  Amirah    Provider s Name:  [facility]   Medical Record No.  [id number]   Start of Care Date:  10/01/21   Onset Date:  06/24/21   Type:     _X__PT   ___OT   ___SLP Medical Diagnosis:  S76.012A     PT Diagnosis:  L glut strain   Visits from SOC:  1      _________________________________________________________________________________  Plan of Treatment/Functional Goals:  balance training, joint mobilization, manual therapy, motor coordination training, neuromuscular re-education, ROM, strengthening, stretching, transfer training     Hot packs, Electrical stimulation, Cryotherapy, Ultrasound     Goals  Goal Identifier: Pain  Goal Description: Pt will report a 3/10 pain in her gluts during sitting in order to improve tolerance to car rides.   Target Date: 11/15/21    Goal Identifier: Sitting  Goal Description: Pt will tolerate sitting for 1 hour to increase ability to drive to [place].   Target Date: 11/29/21    Goal Identifier: ROM  Goal Description: Pt will demonstrate 120 deg of L hip flexion to improve tissue extensibiltiy for walking and sitting tolerance.   Target Date: 11/15/21            Therapy Frequency:  2 times/Week  Predicted Duration of Therapy Intervention:  8 weeks    Ruth Dean PT                 I CERTIFY THE NEED FOR THESE SERVICES FURNISHED UNDER        THIS PLAN OF TREATMENT AND WHILE UNDER MY CARE     (Physician co-signature of this document indicates review and certification of the therapy plan).                       Certification Date From:  10/01/21   Certification Date To:  11/26/21    Referring Provider:  Dr. Morrison    Initial Assessment        See Epic Evaluation Start of Care Date: 10/01/21

## 2021-10-04 NOTE — TELEPHONE ENCOUNTER
Left message on machine to call back. Also sent a [employer] message with Felipe Morrison MD response.  Tina White LPN on 10/4/2021 at 1:22 PM

## 2021-10-04 NOTE — PROGRESS NOTES
10/01/21 1400   General Information   Type of Visit Initial OP Ortho PT Evaluation   Start of Care Date 10/01/21   Referring Physician Dr. Morrison   Patient/Family Goals Statement pt would like to be able to sit without pain.    Orders Evaluate and Treat   Date of Order 08/31/21   Certification Required? Yes   Medical Diagnosis S76.012A   Surgical/Medical history reviewed Yes   Precautions/Limitations no known precautions/limitations   General Information Comments Pt is a 76 year old female referred to skilled PT services following a significant increase in L glut pain after a fall with a wheelbarrow where she overstretched her muscles, but was having pain in the glut before. is now impacting her tolerance to sitting and riding in the car. [place].    Presentation and Etiology   Pertinent history of current problem (include personal factors and/or comorbidities that impact the POC) PMH: asthma, bladder control, menopause, arthritis, osteoporosis.    Impairments A. Pain;B. Decreased WB tolerance   Functional Limitations perform activities of daily living;perform desired leisure / sports activities   Symptom Location L buttock   How/Where did it occur From insidious onset  (started insidous, exacerbated by fall)   Onset date of current episode/exacerbation 06/24/21   Chronicity Recurrent   Pain rating (0-10 point scale) Best (/10);Worst (/10)   Best (/10) 2/10   Worst (/10) 10/10   Pain quality A. Sharp;C. Aching   Frequency of pain/symptoms B. Intermittent   Pain/symptoms are: Worse during the day   Pain/symptoms exacerbated by A. Sitting   Pain/symptoms eased by E. Changing positions;I. OTC medication(s)   Progression of symptoms since onset: Worsened   Current / Previous Interventions   Diagnostic Tests: X-ray   X-ray Results unremarkable   Prior Level of Function   Prior Level of Function-Mobility independent   Prior Level of Function-ADLs independent   Functional Level  Prior Comment independent   Current Level of Function   Current Community Support Family/friend caregiver   Patient role/employment history F. Retired   [facility]   Current equipment-Gait/Locomotion None   Current equipment-ADL None   Fall Risk Screen   Fall screen completed by PT   Have you fallen 2 or more times in the past year? Yes   Have you fallen and had an injury in the past year? Yes   Is patient a fall risk? Yes;Department fall risk interventions implemented   Abuse Screen (yes response referral indicated)   Feels Unsafe at Home or Work/School no   Feels Threatened by Someone no   Does Anyone Try to Keep You From Having Contact with Others or Doing Things Outside Your Home? no   Physical Signs of Abuse Present no   Hip Objective Findings   Side (if bilateral, select both right and left) Left   Gait/Locomotion equal stride length and stance time, wide RGAHU with forward lean, no AD   Hip ROM Comments hip ROM equal B, pt is tight in gluts on both sides   Lumbar ROM WFL   Pelvic Screen negative   Straight Leg Raise Test negative   Scour Test negative   JESSICA Test negative   FADIR Test negative   Palpation increased tissue tension at hamstring attachment, pt very sensative, however R side is more tight in tension overall than L glut.    Left Hip Flexion PROM decreased (tissue tension)   Left Hip Ext PROM WFL   Left Hip Flexion Strength 3+/5   Left Hip Abduction Strength 4/5   Left Hip Extension Strength 4/5   Left Knee Flexion Strength 4+/5   Left Knee Extension Strength 4/5   Left Hamstring Flexibility decreased   Left Piriformis Flexibility decreased   Planned Therapy Interventions   Planned Therapy Interventions balance training;joint mobilization;manual therapy;motor coordination training;neuromuscular re-education;ROM;strengthening;stretching;transfer training   Planned Modality Interventions   Planned Modality Interventions Hot packs;Electrical stimulation;Cryotherapy;Ultrasound    Clinical Impression   Criteria for Skilled Therapeutic Interventions Met yes, treatment indicated   PT Diagnosis L glut strain   Influenced by the following impairments pain, swelling, decreased sitting tolerance   Functional limitations due to impairments pain will sitting, decreased LE stability    Clinical Presentation Stable/Uncomplicated   Clinical Presentation Rationale symptoms consistent with diagnosis, only bothers pt during sitting   Clinical Decision Making (Complexity) Low complexity   Therapy Frequency 2 times/Week   Predicted Duration of Therapy Intervention (days/wks) 8 weeks   Risk & Benefits of therapy have been explained Yes   Patient, Family & other staff in agreement with plan of care Yes   Education Assessment   Preferred Learning Style Listening;Reading   Barriers to Learning No barriers   ORTHO GOALS   PT Ortho Eval Goals 1;2;3   Ortho Goal 1   Goal Identifier Pain   Goal Description Pt will report a 3/10 pain in her gluts during sitting in order to improve tolerance to car rides.    Target Date 11/15/21   Ortho Goal 2   Goal Identifier Sitting   Goal Description Pt will tolerate sitting for 1 hour to increase ability to drive to [place].    Target Date 11/29/21   Ortho Goal 3   Goal Identifier ROM   Goal Description Pt will demonstrate 120 deg of L hip flexion to improve tissue extensibiltiy for walking and sitting tolerance.    Target Date 11/15/21   Total Evaluation Time   PT Eval, Low Complexity Minutes (84243) 15   Therapy Certification   Certification date from 10/01/21   Certification date to 11/26/21   Medical Diagnosis S76.012A

## 2021-10-05 ENCOUNTER — HOSPITAL ENCOUNTER (OUTPATIENT)
Dept: PHYSICAL THERAPY | Facility: OTHER | Age: 76
Setting detail: THERAPIES SERIES
End: 2021-10-05
Attending: FAMILY MEDICINE
Payer: MEDICARE

## 2021-10-05 PROCEDURE — 97035 APP MDLTY 1+ULTRASOUND EA 15: CPT | Mod: GP

## 2021-10-05 PROCEDURE — 97110 THERAPEUTIC EXERCISES: CPT | Mod: GP

## 2021-10-05 PROCEDURE — 97140 MANUAL THERAPY 1/> REGIONS: CPT | Mod: GP

## 2021-10-14 ENCOUNTER — OFFICE VISIT (OUTPATIENT)
Dept: OPHTHALMOLOGY | Facility: CLINIC | Age: 76
End: 2021-10-14
Attending: OPHTHALMOLOGY
Payer: MEDICARE

## 2021-10-14 DIAGNOSIS — H35.351 CYSTOID MACULAR EDEMA, RIGHT: ICD-10-CM

## 2021-10-14 PROCEDURE — 99213 OFFICE O/P EST LOW 20 MIN: CPT | Performed by: OPHTHALMOLOGY

## 2021-10-14 PROCEDURE — 92134 CPTRZ OPH DX IMG PST SGM RTA: CPT | Performed by: OPHTHALMOLOGY

## 2021-10-14 PROCEDURE — G0463 HOSPITAL OUTPT CLINIC VISIT: HCPCS | Mod: 25

## 2021-10-14 ASSESSMENT — REFRACTION_WEARINGRX
OD_ADD: +2.50
OD_SPHERE: PLANO
OS_ADD: +2.50
OS_AXIS: 105
OS_SPHERE: -0.75
OS_CYLINDER: +0.50

## 2021-10-14 ASSESSMENT — TONOMETRY
IOP_METHOD: TONOPEN
OD_IOP_MMHG: 14
OS_IOP_MMHG: 12

## 2021-10-14 ASSESSMENT — EXTERNAL EXAM - LEFT EYE: OS_EXAM: NORMAL

## 2021-10-14 ASSESSMENT — SLIT LAMP EXAM - LIDS: COMMENTS: PTOSIS, MGD

## 2021-10-14 ASSESSMENT — CUP TO DISC RATIO
OS_RATIO: 0.5
OD_RATIO: 0.4

## 2021-10-14 ASSESSMENT — VISUAL ACUITY
OD_SC: 20/80
METHOD: SNELLEN - LINEAR
OS_SC: 20/20

## 2021-10-14 ASSESSMENT — EXTERNAL EXAM - RIGHT EYE: OD_EXAM: NORMAL

## 2021-10-14 ASSESSMENT — CONF VISUAL FIELD
OD_NORMAL: 1
OS_NORMAL: 1
METHOD: COUNTING FINGERS

## 2021-10-14 NOTE — NURSING NOTE
Chief Complaints and History of Present Illnesses   Patient presents with     Cystoid Macular Edema Follow Up     6 month follow up RE     Chief Complaint(s) and History of Present Illness(es)     Cystoid Macular Edema Follow Up     Comments: 6 month follow up RE              Comments     Pt states vision is about the same as last visit. Pt notes a few days when vision was slightly blurred, but vision is back to normal today.  No new flashes or floaters. No redness. Dryness in BE, relief with artificial tears.    CHATO Otto October 14, 2021 7:09 AM

## 2021-10-14 NOTE — PROGRESS NOTES
I have confirmed the patient's and reviewed Past Medical History, Past Surgical History, Social History, Family History, Problem List, Medication List and agree with Tech note.    CC: cystoid macular edema right eye, follow up     Interval history: decrease change in vision right eye, .     HPI: patient of Dr. Maria, noted to have cystoid macular edema with Epiretinal membrane on visit in 09/2017,  Patient was in [place] over the Winter and was switched to Durezol and ketorolac last Winter and was seen by associated retina consultants in [place] this winter.      MEDS: OD  Ketorolac twice daily change to twice a day as OCT shows new fluid  Durezol twice daily  STOP  Dorzalamide daily STOP    Assessment/plan:  1. Cystoid Macular Edema Right Eye   - Epiretinal membrane noted on OCT, also on left eye    - confirmed by IVFA again today.  No polyps on ICG   - Pinhole now improvement visual acuity right eye and recommend manifest refraction    - plan to continue drops right eye and stop durezol and dorzalamide     2. Corneal scar right eye, stable  - follows with Dr. Maria, Scleral lens as needed for vision but not currently wearing    3. Thyroid eye disease, quiet  -  H/o multiple lid surgeries right eye      4. H/o Retinal detachment  Both eyes   - early 2000s , details unknown, retina attached    5. 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 Marie in 6 months for corneal scar right eye         Ramandeep Guzman MD PhD.  Professor & Chair

## 2021-10-18 ENCOUNTER — MYC MEDICAL ADVICE (OUTPATIENT)
Dept: FAMILY MEDICINE | Facility: OTHER | Age: 76
End: 2021-10-18

## 2021-10-18 ENCOUNTER — HOSPITAL ENCOUNTER (OUTPATIENT)
Dept: PHYSICAL THERAPY | Facility: OTHER | Age: 76
Setting detail: THERAPIES SERIES
End: 2021-10-18
Attending: FAMILY MEDICINE
Payer: MEDICARE

## 2021-10-18 PROCEDURE — 97110 THERAPEUTIC EXERCISES: CPT | Mod: GP

## 2021-10-18 PROCEDURE — 97035 APP MDLTY 1+ULTRASOUND EA 15: CPT | Mod: GP

## 2021-10-18 PROCEDURE — 97140 MANUAL THERAPY 1/> REGIONS: CPT | Mod: GP

## 2021-10-19 ENCOUNTER — MYC MEDICAL ADVICE (OUTPATIENT)
Dept: INTERNAL MEDICINE | Facility: OTHER | Age: 76
End: 2021-10-19

## 2021-10-19 DIAGNOSIS — M79.18 LEFT BUTTOCK PAIN: Primary | ICD-10-CM

## 2021-10-19 RX ORDER — HYDROCODONE BITARTRATE AND ACETAMINOPHEN 5; 325 MG/1; MG/1
1 TABLET ORAL EVERY 6 HOURS PRN
Qty: 10 TABLET | Refills: 0 | Status: SHIPPED | OUTPATIENT
Start: 2021-10-19 | End: 2021-10-22

## 2021-10-19 NOTE — TELEPHONE ENCOUNTER
Please let the patient know I called in a short supply of hydrocodone acetaminophen to her pharmacy she requested.  I would recommend that she uses this sparingly and I would prefer she uses ibuprofen and alteration with Tylenol during [place] drive [place].  She is not to drive at all while taking this medication.

## 2021-10-19 NOTE — TELEPHONE ENCOUNTER
After verifying patient's name and date of birth, told patient that Dr. Jain is out of the office until 10/25/21. Told her that she should contact her primary care provider for a prescription as she is leaving for [place] before Dr. Jain will return. She voiced understanding and agreement.     Shawnee Pascual LPN....10/19/2021 11:16 AM

## 2021-10-19 NOTE — PROGRESS NOTES
Outpatient Physical Therapy Discharge Note     Patient: Annabelle Newsome  : 1945    Beginning/End Dates of Reporting Period:  10/1/21 to 10/18/21    Referring Provider: Dr. Morrison    Therapy Diagnosis: glut pain     Client Self Report: pt is leaving for [place] after this session. Pt reports some improvement in sitting tolerance to 50 minutes but still is painful. low back was sore after last session but now feels a lot better.        Goals:  Goal Identifier Pain   Goal Description Pt will report a 3/10 pain in her gluts during sitting in order to improve tolerance to car rides.    Target Date 11/15/21   Date Met      Progress (detail required for progress note):   Goals not met, pt leaving before progression was possible.      Goal Identifier Sitting   Goal Description Pt will tolerate sitting for 1 hour to increase ability to drive to [place].    Target Date 21   Date Met      Progress (detail required for progress note):   Goals not met, pt leaving before progression was possible.  Extensive education provided on repositioning and stretching for pain relief.      Goal Identifier ROM   Goal Description Pt will demonstrate 120 deg of L hip flexion to improve tissue extensibiltiy for walking and sitting tolerance.    Target Date 11/15/21   Date Met      Progress (detail required for progress note):  Goals not met, pt leaving before progression was possible.        Plan:  Discharge from therapy.    Discharge:    Reason for Discharge: Patient chooses to discontinue therapy. Pt is leaving for [place]     Equipment Issued: none    Discharge Plan: Patient to continue home program.   Rotation Flap Text: The defect edges were debeveled with a #15 scalpel blade.  Given the location of the defect, shape of the defect and the proximity to free margins a rotation flap was deemed most appropriate.  Using a sterile surgical marker, an appropriate rotation flap was drawn incorporating the defect and placing the expected incisions within the relaxed skin tension lines where possible.    The area thus outlined was incised deep to adipose tissue with a #15 scalpel blade.  The skin margins were undermined to an appropriate distance in all directions utilizing iris scissors.

## 2021-10-20 NOTE — TELEPHONE ENCOUNTER
Called patient to inform her of prescription at pharmacy and Mayelin Spangler's instructions.  Rachel Foley LPN on 10/20/2021 at 12:16 PM

## 2021-11-19 ENCOUNTER — TRANSFERRED RECORDS (OUTPATIENT)
Dept: HEALTH INFORMATION MANAGEMENT | Facility: CLINIC | Age: 76
End: 2021-11-19
Payer: MEDICARE

## 2022-01-04 ENCOUNTER — TRANSFERRED RECORDS (OUTPATIENT)
Dept: HEALTH INFORMATION MANAGEMENT | Facility: CLINIC | Age: 77
End: 2022-01-04
Payer: MEDICARE

## 2022-04-08 ENCOUNTER — TRANSFERRED RECORDS (OUTPATIENT)
Dept: HEALTH INFORMATION MANAGEMENT | Facility: CLINIC | Age: 77
End: 2022-04-08
Payer: MEDICARE

## 2022-05-03 DIAGNOSIS — H35.351 CYSTOID MACULAR EDEMA, RIGHT: Primary | ICD-10-CM

## 2022-05-07 ENCOUNTER — HEALTH MAINTENANCE LETTER (OUTPATIENT)
Age: 77
End: 2022-05-07

## 2022-05-12 ENCOUNTER — OFFICE VISIT (OUTPATIENT)
Dept: OPHTHALMOLOGY | Facility: CLINIC | Age: 77
End: 2022-05-12
Attending: OPHTHALMOLOGY
Payer: MEDICARE

## 2022-05-12 DIAGNOSIS — H35.351 CYSTOID MACULAR EDEMA, RIGHT: Primary | ICD-10-CM

## 2022-05-12 DIAGNOSIS — H17.9 CORNEAL SCAR: ICD-10-CM

## 2022-05-12 DIAGNOSIS — H04.129 DRY EYE: ICD-10-CM

## 2022-05-12 DIAGNOSIS — H35.351 CYSTOID MACULAR EDEMA, RIGHT: ICD-10-CM

## 2022-05-12 PROCEDURE — 92134 CPTRZ OPH DX IMG PST SGM RTA: CPT | Performed by: OPHTHALMOLOGY

## 2022-05-12 PROCEDURE — 999N000103 HC STATISTIC NO CHARGE FACILITY FEE

## 2022-05-12 PROCEDURE — 99213 OFFICE O/P EST LOW 20 MIN: CPT | Mod: 25 | Performed by: OPHTHALMOLOGY

## 2022-05-12 PROCEDURE — G0463 HOSPITAL OUTPT CLINIC VISIT: HCPCS

## 2022-05-12 PROCEDURE — 99214 OFFICE O/P EST MOD 30 MIN: CPT | Mod: 27 | Performed by: OPHTHALMOLOGY

## 2022-05-12 ASSESSMENT — VISUAL ACUITY
OD_SC+: -1
OS_SC: 20/20
OD_SC: 20/70
OD_SC+: -1
OD_PH_SC: 20/60
OD_PH_SC+: -1
OD_PH_SC: 20/60
OS_SC: 20/20
METHOD: SNELLEN - LINEAR
OD_PH_SC+: -1
OD_SC: 20/70
METHOD: SNELLEN - LINEAR

## 2022-05-12 ASSESSMENT — CUP TO DISC RATIO
OD_RATIO: 0.4
OS_RATIO: 0.5

## 2022-05-12 ASSESSMENT — CONF VISUAL FIELD
OS_NORMAL: 1
METHOD: COUNTING FINGERS
OD_NORMAL: 1
METHOD: COUNTING FINGERS
OS_NORMAL: 1
OD_NORMAL: 1

## 2022-05-12 ASSESSMENT — TONOMETRY
OD_IOP_MMHG: 14
OS_IOP_MMHG: 13
IOP_METHOD: ICARE
OD_IOP_MMHG: 14
OS_IOP_MMHG: 13
IOP_METHOD: ICARE

## 2022-05-12 ASSESSMENT — EXTERNAL EXAM - RIGHT EYE
OD_EXAM: NO PROPTOSIS OR SCLERAL SHOW
OD_EXAM: NORMAL

## 2022-05-12 ASSESSMENT — SLIT LAMP EXAM - LIDS: COMMENTS: PTOSIS, MGD

## 2022-05-12 ASSESSMENT — EXTERNAL EXAM - LEFT EYE
OS_EXAM: NO PROPTOSIS OR SCLERAL SHOW
OS_EXAM: NORMAL

## 2022-05-12 NOTE — NURSING NOTE
Chief Complaints and History of Present Illnesses   Patient presents with     Follow Up     Cystoid macular edema, right     Chief Complaint(s) and History of Present Illness(es)     Follow Up     Laterality: right eye    Onset: gradual    Onset: years ago    Associated symptoms: dryness, photophobia and floaters.  Negative for eye pain, tearing and flashes    Treatments tried: artificial tears    Pain scale: 0/10    Comments: Cystoid macular edema, right              Comments     Pt states vision is stable since last visit.  No change to floaters.  AT's PRN.    AMBER Flaherty May 12, 2022 7:38 AM

## 2022-05-12 NOTE — PROGRESS NOTES
Annabelle Newsome is a 75 year old female presenting for 1 year  follow up.      Interval history:  Intermittently the left eye is blurring. Not a lot of the time. Seems to happen sometimes in the morning and sometimes in the middle of the day. She suspects that it is the ointment leftover from the night before. This has been going on and off for a few months now.    Overall vision is pretty good.     December and January received steroid into the eye (unknown if subtenon's or elsewhere) while down in [place] - and was taken off Durezol.     Vision is stable. No new flashes, floaters, or diplopia. No pain, redness, or tearing.      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 Corneal scar right eye, stable   -approx 20% thinning - stable   -continue frequent preservative-free tears   - reviewed surgical options would be DALK vs PKP. Scar is too deep for PTK (~40% ?). Patient doing well at this time and would like to continue observation.   - K transplant may make binocular diplopia worse, but would likely help monocular diplopia/glare    #. Binocular diplopia - longstanding  - Right esotropia, strab surgery not recommended  - Dr. Arceo recommended trial of blurred +20 CL versus black occluder lens, patient did not find it helpful and is no longer wearing it    # Dry eye, doing well -stable   -continue restasis BID OU   -continue PF tears q~2 hours each eye.   - Emphasized need for art tears.    #. Thyroid eye disease, quiet   H/o  multiple lid surgeries right eye    -last visit with Caleb - decided not to pursue any surgery at this time    #. H/o Retinal detachment  Both eyes    -reviewed RT/RD precautions    # Hx of CME, right eye  - follows w/ Dr. Dewitt    # ERM  - non-surgical per retina team    # Functionally monocular  Reviewed monocular precautions.      F/u cornea 1 year, sooner PRN    Joaquina Mckeon MD  Ophthalmology Resident PGY3  [facility]       Attending Physician Attestation:  Complete documentation of historical and exam elements from today's encounter can be found in the full encounter summary report (not reduplicated in this progress note).  I personally obtained the chief complaint(s) and history of present illness.  I confirmed and edited as necessary the review of systems, past medical/surgical history, family history, social history, and examination findings as documented by others; and I examined the patient myself.  I personally reviewed the relevant tests, images, and reports as documented above.  I formulated and edited as necessary the assessment and plan and discussed the findings and management plan with the patient and family. - Delfino Silverio MD

## 2022-05-12 NOTE — NURSING NOTE
Chief Complaints and History of Present Illnesses   Patient presents with     Follow Up     Corneal scar - Right Eye      Chief Complaint(s) and History of Present Illness(es)     Follow Up     Laterality: right eye    Course: stable    Associated symptoms: dryness, photophobia and floaters.  Negative for glare, haloes, eye pain, tearing and flashes    Treatments tried: artificial tears    Pain scale: 0/10    Comments: Corneal scar - Right Eye               Comments     Pt states vision is stable since last visit.  No change to floaters.  AT's PRN.    AMBER Flaherty May 12, 2022 7:38 AM

## 2022-05-12 NOTE — PROGRESS NOTES
I have confirmed the patient's and reviewed Past Medical History, Past Surgical History, Social History, Family History, Problem List, Medication List and agree with Tech note.    CC: cystoid macular edema right eye, follow up     Interval history: decrease change in vision right eye, .     HPI: patient of Dr. Maria, noted to have cystoid macular edema with Epiretinal membrane on visit in 09/2017,  Patient was in [place] over the Winter and was switched to Durezol and ketorolac last Winter and was seen by associated retina consultants in [place] this winter.      MEDS: OD  Ketorolac once daily and keep once a day as OCT shows reduced fluid  Durezol twice daily  STOP  Dorzalamide daily STOP    Assessment/plan:  1. Cystoid Macular Edema Right Eye   - Epiretinal membrane noted on OCT, also on left eye    - confirmed by IVFA again today.  No polyps on ICG   - Pinhole now improvement visual acuity right eye and recommend manifest refraction    - plan to continue drops right eye and stop durezol and dorzalamide     2. Corneal scar right eye, stable  - follows with Dr. Maria, Scleral lens as needed for vision but not currently wearing    3. Thyroid eye disease, quiet  -  H/o multiple lid surgeries right eye      4. H/o Retinal detachment  and Epiretinal membrane Both eyes   - monitor by Optical Coherence Tomography Scan     5. 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 Marie in 6 months for corneal scar right eye     Return to clinic one  year for Exam/OCT     Ramandeep Guzman MD PhD.  Professor & Chair

## 2022-06-22 ENCOUNTER — MYC MEDICAL ADVICE (OUTPATIENT)
Dept: OPHTHALMOLOGY | Facility: CLINIC | Age: 77
End: 2022-06-22

## 2022-07-21 ENCOUNTER — OFFICE VISIT (OUTPATIENT)
Dept: OPHTHALMOLOGY | Facility: CLINIC | Age: 77
End: 2022-07-21
Attending: OPHTHALMOLOGY
Payer: MEDICARE

## 2022-07-21 DIAGNOSIS — H04.129 DRY EYE: ICD-10-CM

## 2022-07-21 DIAGNOSIS — H35.351 CYSTOID MACULAR EDEMA, RIGHT: ICD-10-CM

## 2022-07-21 DIAGNOSIS — H35.351 CYSTOID MACULAR EDEMA, RIGHT: Primary | ICD-10-CM

## 2022-07-21 DIAGNOSIS — H53.8 BLURRED VISION: ICD-10-CM

## 2022-07-21 PROCEDURE — 92134 CPTRZ OPH DX IMG PST SGM RTA: CPT | Performed by: OPHTHALMOLOGY

## 2022-07-21 PROCEDURE — G0463 HOSPITAL OUTPT CLINIC VISIT: HCPCS

## 2022-07-21 PROCEDURE — 99214 OFFICE O/P EST MOD 30 MIN: CPT | Mod: 27 | Performed by: OPHTHALMOLOGY

## 2022-07-21 PROCEDURE — 92134 CPTRZ OPH DX IMG PST SGM RTA: CPT | Mod: 26 | Performed by: STUDENT IN AN ORGANIZED HEALTH CARE EDUCATION/TRAINING PROGRAM

## 2022-07-21 PROCEDURE — 999N000103 HC STATISTIC NO CHARGE FACILITY FEE

## 2022-07-21 PROCEDURE — 99213 OFFICE O/P EST LOW 20 MIN: CPT | Mod: 25 | Performed by: STUDENT IN AN ORGANIZED HEALTH CARE EDUCATION/TRAINING PROGRAM

## 2022-07-21 RX ORDER — CYCLOSPORINE 0.5 MG/ML
1 EMULSION OPHTHALMIC 2 TIMES DAILY
Qty: 60 EACH | Refills: 11 | Status: SHIPPED | OUTPATIENT
Start: 2022-07-21 | End: 2023-05-11

## 2022-07-21 RX ORDER — KETOROLAC TROMETHAMINE 5 MG/ML
1 SOLUTION OPHTHALMIC 2 TIMES DAILY
Qty: 10 ML | Refills: 3 | Status: SHIPPED | OUTPATIENT
Start: 2022-07-21 | End: 2023-12-09

## 2022-07-21 ASSESSMENT — CONF VISUAL FIELD
OD_NORMAL: 1
OD_NORMAL: 1
OS_NORMAL: 1
OS_NORMAL: 1

## 2022-07-21 ASSESSMENT — VISUAL ACUITY
OD_PH_SC: 20/70
OD_PH_SC: 20/70
OD_SC: 20/80
OS_SC: 20/20
OS_SC: 20/20
OD_SC: 20/80
METHOD: SNELLEN - LINEAR
OD_SC+: -2
METHOD: SNELLEN - LINEAR
OD_SC+: -2

## 2022-07-21 ASSESSMENT — TONOMETRY
OD_IOP_MMHG: 14
OD_IOP_MMHG: 14
OS_IOP_MMHG: 12
IOP_METHOD: TONOPEN
OS_IOP_MMHG: 12
IOP_METHOD: TONOPEN

## 2022-07-21 ASSESSMENT — CUP TO DISC RATIO
OD_RATIO: 0.4
OS_RATIO: 0.5

## 2022-07-21 ASSESSMENT — EXTERNAL EXAM - LEFT EYE
OS_EXAM: NO PROPTOSIS OR SCLERAL SHOW
OS_EXAM: NO PROPTOSIS OR SCLERAL SHOW

## 2022-07-21 ASSESSMENT — EXTERNAL EXAM - RIGHT EYE
OD_EXAM: NO PROPTOSIS OR SCLERAL SHOW
OD_EXAM: NO PROPTOSIS OR SCLERAL SHOW

## 2022-07-21 NOTE — PROGRESS NOTES
Annabelle Newsome is a 75 year old female presenting for 1 year  follow up.      Interval history:  Woke up about 3 weeks ago and noted that her vision in both eyes was more blurred than it used to be. This has continued to this time, though has improved somewhat. With her reading glasses, however, she feels that her vision is unchanged and clear. This blurriness without correction is present throughout the day and does not vary. Distance vision stable.    December and January received steroid into the eye (unknown if subtenon's or elsewhere) while down in [place] - and was taken off Durezol.     No new flashes, change in floaters, or change in diplopia. No pain, redness, or tearing.    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  - VA improved with +3 near glasses (trial frame)  - Pt should see retina based on prior retinal issues.    # Corneal scar right eye, stable   - approx 20% thinning - stable   - continue frequent preservative-free tears   - reviewed surgical options would be DALK vs PKP. Scar is too deep for PTK (~40% ?). Patient doing well at this time and would like to continue observation.   - K transplant may make binocular diplopia worse, but would likely help monocular diplopia/glare    #. Binocular diplopia - longstanding  - Right esotropia, strab surgery not recommended  - Dr. Arceo recommended trial of blurred +20 CL versus black occluder lens, patient did not find it helpful and is no longer wearing it    # Dry eye, doing well -stable   - continue restasis BID OU   -  continue PF tears q~2 hours each eye.   - Emphasized need for art tears.    #. Thyroid eye disease, quiet   - H/o multiple lid surgeries right eye    - last visit with Caleb - decided not to pursue any surgery at this time    #. H/o Retinal detachment  Both eyes    -reviewed RT/RD precautions    # Hx of CME, right eye  - follows w/ Dr. Dewitt    # ERM  - non-surgical per retina team    # Functionally monocular  - Reviewed monocular precautions    F/u cornea 1 year, sooner PRN  - Pt to see retina for change in left eye VA    Joseph Best MD  Fellow, Cornea, External Disease and Refractive Surgery  Department of Ophthalmology  [facility]      Attending Physician Attestation:  Complete documentation of historical and exam elements from today's encounter can be found in the full encounter summary report (not reduplicated in this progress note).  I personally obtained the chief complaint(s) and history of present illness.  I confirmed and edited as necessary the review of systems, past medical/surgical history, family history, social history, and examination findings as documented by others; and I examined the patient myself.  I personally reviewed the relevant tests, images, and reports as documented above.  I formulated and edited as necessary the assessment and plan and discussed the findings and management plan with the patient and family. - Delfino Silverio MD

## 2022-07-21 NOTE — NURSING NOTE
Chief Complaints and History of Present Illnesses   Patient presents with     Blurred Vision Left Eye     Near vision seems blurred when NOT wearing reading glasses. When wearing readers vision is good at near.  Got worried something was wrong.  Systane prn  Restasis bid BE  Ketorolac every day DEXTER [facility] 7:59 AM July 21, 2022          Chief Complaint(s) and History of Present Illness(es)     Blurred Vision Left Eye     Laterality: left eye    Onset: sudden    Onset: 3 weeks ago    Quality: blurred vision    Severity: mild    Frequency: infrequently    Timing: at random times    Context: near vision    Associated symptoms: Negative for eye pain, pain with eye movement, flashes and floaters    Treatments tried: eye drops and artificial tears    Pain scale: 0/10    Comments: Near vision seems blurred when NOT wearing reading glasses. When wearing readers vision is good at near.  Got worried something was wrong.  Systane prn  Restasis bid BE  Ketorolac every day DEXTER FERREIRA 7:59 AM July 21, 2022

## 2022-07-21 NOTE — PROGRESS NOTES
I have confirmed the patient's and reviewed Past Medical History, Past Surgical History, Social History, Family History, Problem List, Medication List and agree with Tech note.    CC: cystoid macular edema right eye, follow up     Interval history: worsening vision in the left eye . There is not distortion as much as there is blurriness.  It is difficult to discern whether it is the right or left, but she is most concerned about the left eye.     HPI: Annabelle Newsome is a 76 year old female here for retina follow up.     Referred by Dr. Maria, noted to have cystoid macular edema with Epiretinal membrane on visit in 09/2017,  Patient evan in [place], [place] and follows with [facility] in [place] when she is there.      MEDS: OD  Ketorolac once daily right eye  Restasis: both eyes BID  Lubricating ointment both eyes at night  Artificial tears both eyes    Ocular Surgical History:  11/9/2001  - LEFT RD repair ([facility] [place] [facility])  3/20/2007  - LEFT CEIOL  12/19/2007  - RIGHT CEIOL  12/22/2008  - RIGHT RD repair ([facility])      Imaging:  OCT Macula 07/21/22  OD: ERM with foveal contour lost 2/2 central IRF, increased from previous ( ->391), no drusen, thin choroid, PHF   OS: mild ERM with blunting of foveal contour, no fluid, choroid thin, no drusen, hyaloid     Assessment/plan:    1. Cystoid Macular Edema Right Eye   - Epiretinal membrane noted on OCT, also on left eye    - confirmed by IVFA again today.  No polyps on ICG   - 11/2021 & 1/2022 received two injections - was told there was some improvement   - OCT with worsened IRF today    - Consider Ozurdex if increased dose of ketorolac does not work     2. Corneal scar right eye, stable  - follows with Dr. Maria, Scleral lens as needed for vision but not currently wearing    3. Thyroid eye disease, quiet  -  H/o multiple lid surgeries right eye      4. H/o Retinal detachment  and Epiretinal membrane Both  eyes   - s/p RIGHT SB/PPV/EL (~2007)  - s/p LEFT ?SB (~2005)  - monitor by Optical Coherence Tomography Scan     5. 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 Ellis     Return to retina clinic in 4 weeks     Dr. Marie in 6 months for corneal scar right eye     Return to clinic one  year for Exam/OCT     Aayush Lugo MD  Vitreoretinal Surgical Fellow, PGY6  [facility]    Ramandeep Guzman MD PhD.  Professor & Chair

## 2022-09-06 ENCOUNTER — TELEPHONE (OUTPATIENT)
Dept: OPHTHALMOLOGY | Facility: CLINIC | Age: 77
End: 2022-09-06

## 2022-09-06 DIAGNOSIS — H35.351 CYSTOID MACULAR EDEMA, RIGHT: Primary | ICD-10-CM

## 2022-09-12 ENCOUNTER — OFFICE VISIT (OUTPATIENT)
Dept: OPHTHALMOLOGY | Facility: CLINIC | Age: 77
End: 2022-09-12
Attending: STUDENT IN AN ORGANIZED HEALTH CARE EDUCATION/TRAINING PROGRAM
Payer: MEDICARE

## 2022-09-12 DIAGNOSIS — H35.351 CYSTOID MACULAR EDEMA, RIGHT: Primary | ICD-10-CM

## 2022-09-12 PROCEDURE — 92134 CPTRZ OPH DX IMG PST SGM RTA: CPT | Mod: 26 | Performed by: STUDENT IN AN ORGANIZED HEALTH CARE EDUCATION/TRAINING PROGRAM

## 2022-09-12 PROCEDURE — 92134 CPTRZ OPH DX IMG PST SGM RTA: CPT | Performed by: STUDENT IN AN ORGANIZED HEALTH CARE EDUCATION/TRAINING PROGRAM

## 2022-09-12 PROCEDURE — G0463 HOSPITAL OUTPT CLINIC VISIT: HCPCS | Mod: 25

## 2022-09-12 PROCEDURE — 92014 COMPRE OPH EXAM EST PT 1/>: CPT | Performed by: STUDENT IN AN ORGANIZED HEALTH CARE EDUCATION/TRAINING PROGRAM

## 2022-09-12 RX ORDER — BROMFENAC 1.03 MG/ML
1 SOLUTION/ DROPS OPHTHALMIC DAILY
Qty: 1.7 ML | Refills: 11 | Status: SHIPPED | OUTPATIENT
Start: 2022-09-12 | End: 2024-07-06

## 2022-09-12 RX ORDER — BROMFENAC 1.03 MG/ML
1 SOLUTION/ DROPS OPHTHALMIC DAILY
Qty: 1.7 ML | Refills: 11 | Status: SHIPPED | OUTPATIENT
Start: 2022-09-12 | End: 2022-09-12

## 2022-09-12 ASSESSMENT — TONOMETRY
OD_IOP_MMHG: 18
IOP_METHOD: TONOPEN
OS_IOP_MMHG: 16

## 2022-09-12 ASSESSMENT — VISUAL ACUITY
OD_PH_CC: 20/80
OD_SC+: -1+1
OD_SC: 20/100
OD_PH_CC+: -2
METHOD: SNELLEN - LINEAR
OS_SC: 20/20

## 2022-09-12 ASSESSMENT — CUP TO DISC RATIO
OS_RATIO: 0.5
OD_RATIO: 0.4

## 2022-09-12 ASSESSMENT — EXTERNAL EXAM - LEFT EYE: OS_EXAM: NO PROPTOSIS OR SCLERAL SHOW

## 2022-09-12 ASSESSMENT — CONF VISUAL FIELD: OS_NORMAL: 1

## 2022-09-12 ASSESSMENT — EXTERNAL EXAM - RIGHT EYE: OD_EXAM: NO PROPTOSIS OR SCLERAL SHOW

## 2022-09-12 NOTE — LETTER
9/12/2022       RE: Annabelle Newsome  [address]  [facility] [address]     Dear Colleague,    It was a pleasure to see Annabelle Newsome in the [facility] at the  [facility] today. Please see a copy of my visit note below.    The etiology of her cystoid macular edema in the right eye is somewhat unclear, but it has responded well in the past to topical NSAID drops, sometimes with Durezol. We saw some worsening in 7/2021 which has improved on more frequent ketorolac. Today we changed her topical NSAID to bromfenac.     Thank you,    Aayush Lugo  Vitreoretinal Fellow, PGY6  [facility]    CC: cystoid macular edema right eye, follow up     Interval history: Leaving town to [place] at the end of October. She has an eye appointment in November in [place]. Vision was bothering her a little bit a while ago - sometimes she can't sew because her eyes burn.     HPI: Annabelle Newsome is a 76 year old female here for follow up of cystoid macular edema.     Referred by Dr. Maria, noted to have cystoid macular edema with Epiretinal membrane on visit in 09/2017,  Patient evan in [place], [place] and follows with [facility] in [place] when she is there.      MEDS: OD  Ketorolac twice daily right eye  Restasis: both eyes BID  Lubricating ointment both eyes at night  Artificial tears both eyes 3-4 times per day    Ocular Surgical History:  11/09/2001  LEFT RD repair ([facility] [place] [facility])  03/20/2007  LEFT CEIOL  12/19/2007  RIGHT CEIOL  12/22/2008  RIGHT RD repair ([facility])      Imaging:  OCT Macula 07/21/22  OD: mild ERM with foveal contour lost 2/2 central IRF, improved from previous (-> 369), no drusen, thin choroid, PHF   OS: mild ERM with blunting of foveal contour, no fluid, choroid thin, no drusen, hyaloid     Assessment/plan:    1. Cystoid Macular Edema Right Eye   - chronic - earliest OCTs from 2017 show  more fluid than current.    - fluid significantly improved on OCTs btw 10/2020 and 4/2021. Vision did not improve. When her CME was nearly resolved she was on Durezol daily to 3x/week, Dorzolamide BID, and ketorolac BID. There was a plan to check BCVA, but edema worsened -- no refraction done. Refractive error appears mild from current prescription (nearly plano)   - Etiology is unclear. DDx includes vascular (no h/o RVO), inflammatory (intermediate uveitis, [place] Karina), ERM (current ERM is very mild), degenerative.   - pt received 2 injections in [place] in 2021 with unknown response    09/12/22 - today IRF slightly improved, VA slightly worse. Pt w/ sx most likely from SUMAN. Discussed ATs. Will switch from ketorolac BID to bromfenac daily. If stable-improved at next visit, consider adding Durezol/dorzolamide. Goal will be to treat IRF, then check manifest refraction to determine the impact of the corneal scar on central vision. Printed OCT overview for patient to bring to [place]. Will send letter to patient for her doctor in [place]. Will consider FA at next visit. Consider Starting Durezol +/- dorzolamide at next visit.    2. Corneal scar right eye, stable  - follows with Dr. Marie, Scleral lens as needed for vision but not currently wearing  - might be responsible for limiting vision to 20/70 despite improvements in IRF in 2021    3. Thyroid eye disease, quiet  -  H/o multiple lid surgeries right eye      4. H/o Retinal detachment  and Epiretinal membrane Both eyes   - s/p RIGHT SB/PPV/EL (~2007)  - s/p LEFT ?SB (~2005)  - monitor by Optical Coherence Tomography Scan     5. 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 Ellis     Return to clinic:   Retina - as scheduled 5/2022 for V/T/D with FA (transit OD) and OCT Mac    Dr. Marie as scheduled 5/18/23 for corneal scar right eye     Aayush Lugo MD  Vitreoretinal  Surgical Fellow, PGY6  [facility]                  Again, thank you for allowing me to participate in the care of your patient.      Sincerely,    Aayush Lugo MD

## 2022-09-12 NOTE — NURSING NOTE
Chief Complaints and History of Present Illnesses   Patient presents with     Cystoid Macular Edema Follow Up     Pt here for 7 week follow up on Cystoid Macular Edema Right Eye.      Chief Complaint(s) and History of Present Illness(es)     Cystoid Macular Edema Follow Up     Laterality: right eye    Associated symptoms: Negative for eye pain, headache, flashes and floaters    Comments: Pt here for 7 week follow up on Cystoid Macular Edema Right Eye.               Comments     Pt  Pt using:  Ketorolac BID right eye  Restasis: both eyes BID  Lubricating ointment both eyes at night  Artificial tears both eyes  HANNA HAZEL 12:56 PM September 12, 2022

## 2022-09-12 NOTE — PROGRESS NOTES
CC: cystoid macular edema right eye, follow up     Interval history: Leaving town to [place] at the end of October. She has an eye appointment in November in [place]. Vision was bothering her a little bit a while ago - sometimes she can't sew because her eyes burn.     HPI: Annabelle Newsome is a 76 year old female here for follow up of cystoid macular edema.     Referred by Dr. Maria, noted to have cystoid macular edema with Epiretinal membrane on visit in 09/2017,  Patient snowbirds in [place], [place] and follows with [facility] in [place] when she is there.      MEDS: OD  Ketorolac twice daily right eye  Restasis: both eyes BID  Lubricating ointment both eyes at night  Artificial tears both eyes 3-4 times per day    Ocular Surgical History:  11/09/2001  LEFT RD repair ([facility] [place] [facility])  03/20/2007  LEFT CEIOL  12/19/2007  RIGHT CEIOL  12/22/2008  RIGHT RD repair ([facility])      Imaging:  OCT Macula 07/21/22  OD: mild ERM with foveal contour lost 2/2 central IRF, improved from previous (-> 369), no drusen, thin choroid, PHF   OS: mild ERM with blunting of foveal contour, no fluid, choroid thin, no drusen, hyaloid     Assessment/plan:    1. Cystoid Macular Edema Right Eye   - chronic - earliest OCTs from 2017 show more fluid than current.    - fluid significantly improved on OCTs btw 10/2020 and 4/2021. Vision did not improve. When her CME was nearly resolved she was on Durezol daily to 3x/week, Dorzolamide BID, and ketorolac BID. There was a plan to check BCVA, but edema worsened -- no refraction done. Refractive error appears mild from current prescription (nearly plano)   - Etiology is unclear. DDx includes vascular (no h/o RVO), inflammatory (intermediate uveitis, [place] Karina), ERM (current ERM is very mild), degenerative.   - pt received 2 injections in [place] in 2021 with unknown response    09/12/22 - today IRF slightly improved, VA slightly worse. Pt w/ sx most  likely from SUMAN. Discussed ATs. Will switch from ketorolac BID to bromfenac daily. If stable-improved at next visit, consider adding Durezol/dorzolamide. Goal will be to treat IRF, then check manifest refraction to determine the impact of the corneal scar on central vision. Printed OCT overview for patient to bring to [place]. Will send letter to patient for her doctor in [place]. Will consider FA at next visit. Consider Starting Durezol +/- dorzolamide at next visit.    2. Corneal scar right eye, stable  - follows with Dr. Marie, Scleral lens as needed for vision but not currently wearing  - might be responsible for limiting vision to 20/70 despite improvements in IRF in 2021    3. Thyroid eye disease, quiet  -  H/o multiple lid surgeries right eye      4. H/o Retinal detachment  and Epiretinal membrane Both eyes   - s/p RIGHT SB/PPV/EL (~2007)  - s/p LEFT ?SB (~2005)  - monitor by Optical Coherence Tomography Scan     5. 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 Ellis     Return to clinic:   Retina - as scheduled 5/2022 for V/T/D with FA (transit OD) and OCT Mac    Dr. Marie as scheduled 5/18/23 for corneal scar right eye     ATTESTATION     Attending Physician Attestation:      Complete documentation of historical and exam elements from today's encounter can be found in the full encounter summary report (not reduplicated in this progress note).  I personally obtained the chief complaint(s) and history of present illness.  I confirmed and edited as necessary the review of systems, past medical/surgical history, family history, social history, and examination findings as documented by others; and I examined the patient myself.  I personally reviewed the relevant tests, images, and reports as documented above.  I formulated and edited as necessary the assessment and plan and discussed the findings and management  plan with the patient and family    Aayush Lugo MD  Retina Fellow  Department of Ophthalmology  [facility]  Pager: [phone]

## 2022-12-02 ENCOUNTER — TRANSFERRED RECORDS (OUTPATIENT)
Dept: HEALTH INFORMATION MANAGEMENT | Facility: CLINIC | Age: 77
End: 2022-12-02

## 2022-12-30 ENCOUNTER — TRANSFERRED RECORDS (OUTPATIENT)
Dept: HEALTH INFORMATION MANAGEMENT | Facility: CLINIC | Age: 77
End: 2022-12-30

## 2023-01-20 ENCOUNTER — TRANSFERRED RECORDS (OUTPATIENT)
Dept: HEALTH INFORMATION MANAGEMENT | Facility: CLINIC | Age: 78
End: 2023-01-20
Payer: MEDICARE

## 2023-03-24 ENCOUNTER — TRANSFERRED RECORDS (OUTPATIENT)
Dept: HEALTH INFORMATION MANAGEMENT | Facility: CLINIC | Age: 78
End: 2023-03-24
Payer: MEDICARE

## 2023-04-27 DIAGNOSIS — H35.351 CYSTOID MACULAR EDEMA, RIGHT: Primary | ICD-10-CM

## 2023-05-11 ENCOUNTER — OFFICE VISIT (OUTPATIENT)
Dept: OPHTHALMOLOGY | Facility: CLINIC | Age: 78
End: 2023-05-11
Attending: OPHTHALMOLOGY
Payer: MEDICARE

## 2023-05-11 DIAGNOSIS — H35.351 CYSTOID MACULAR EDEMA, RIGHT: ICD-10-CM

## 2023-05-11 DIAGNOSIS — H17.9 CORNEAL SCAR: ICD-10-CM

## 2023-05-11 DIAGNOSIS — H04.129 DRY EYE: Primary | ICD-10-CM

## 2023-05-11 PROCEDURE — G0463 HOSPITAL OUTPT CLINIC VISIT: HCPCS | Mod: 27 | Performed by: OPHTHALMOLOGY

## 2023-05-11 PROCEDURE — G0463 HOSPITAL OUTPT CLINIC VISIT: HCPCS | Performed by: OPHTHALMOLOGY

## 2023-05-11 PROCEDURE — 99213 OFFICE O/P EST LOW 20 MIN: CPT | Performed by: OPHTHALMOLOGY

## 2023-05-11 PROCEDURE — 99214 OFFICE O/P EST MOD 30 MIN: CPT | Mod: GC | Performed by: OPHTHALMOLOGY

## 2023-05-11 PROCEDURE — 92134 CPTRZ OPH DX IMG PST SGM RTA: CPT | Performed by: OPHTHALMOLOGY

## 2023-05-11 RX ORDER — CYCLOSPORINE 0.5 MG/ML
1 EMULSION OPHTHALMIC 2 TIMES DAILY
Qty: 60 EACH | Refills: 11 | Status: SHIPPED | OUTPATIENT
Start: 2023-05-11

## 2023-05-11 ASSESSMENT — CONF VISUAL FIELD
OS_INFERIOR_NASAL_RESTRICTION: 0
OD_INFERIOR_NASAL_RESTRICTION: 0
OS_SUPERIOR_NASAL_RESTRICTION: 0
OS_SUPERIOR_TEMPORAL_RESTRICTION: 0
OD_SUPERIOR_TEMPORAL_RESTRICTION: 0
OD_INFERIOR_TEMPORAL_RESTRICTION: 0
OS_SUPERIOR_TEMPORAL_RESTRICTION: 0
OD_INFERIOR_TEMPORAL_RESTRICTION: 0
METHOD: COUNTING FINGERS
OS_NORMAL: 1
OD_SUPERIOR_NASAL_RESTRICTION: 0
OD_NORMAL: 1
OD_SUPERIOR_NASAL_RESTRICTION: 0
OD_NORMAL: 1
OS_INFERIOR_TEMPORAL_RESTRICTION: 0
OS_INFERIOR_NASAL_RESTRICTION: 0
METHOD: COUNTING FINGERS
OS_INFERIOR_TEMPORAL_RESTRICTION: 0
OD_INFERIOR_NASAL_RESTRICTION: 0
OS_SUPERIOR_NASAL_RESTRICTION: 0
OD_SUPERIOR_TEMPORAL_RESTRICTION: 0
OS_NORMAL: 1

## 2023-05-11 ASSESSMENT — VISUAL ACUITY
OS_SC: 20/20
OD_SC: 20/80
METHOD: SNELLEN - LINEAR
OD_PH_SC: 20/60
OS_SC: 20/20
OD_SC+: -1
OD_SC: 20/80
OS_SC+: -1
OD_PH_SC+: -1
METHOD: SNELLEN - LINEAR
OD_SC+: -1
OD_PH_SC: 20/60
OS_SC+: -1
OD_PH_SC+: -1

## 2023-05-11 ASSESSMENT — CUP TO DISC RATIO
OD_RATIO: 0.4
OS_RATIO: 0.5

## 2023-05-11 ASSESSMENT — EXTERNAL EXAM - RIGHT EYE
OD_EXAM: NO PROPTOSIS OR SCLERAL SHOW
OD_EXAM: NORMAL

## 2023-05-11 ASSESSMENT — EXTERNAL EXAM - LEFT EYE
OS_EXAM: NO PROPTOSIS OR SCLERAL SHOW
OS_EXAM: NORMAL

## 2023-05-11 ASSESSMENT — TONOMETRY
IOP_METHOD: TONOPEN
OD_IOP_MMHG: 14
OD_IOP_MMHG: 14
OS_IOP_MMHG: 13
IOP_METHOD: TONOPEN
OS_IOP_MMHG: 13

## 2023-05-11 NOTE — PROGRESS NOTES
I have confirmed the patient's and reviewed Past Medical History, Past Surgical History, Social History, Family History, Problem List, Medication List and agree with Tech note.    CC: cystoid macular edema right eye, follow up     Interval history: worsening vision in the left eye . There is not distortion as much as there is blurriness.  It is difficult to discern whether it is the right or left, but she is most concerned about the left eye.     HPI: Annabelle Newsome is a 77 year old female here for retina follow up.     Referred by Dr. Maria, noted to have cystoid macular edema with Epiretinal membrane on visit in 09/2017,  Patient evan in [place], [place] and follows with [facility] in [place] when she is there.      MEDS: OD  Ketorolac once daily right eye  Restasis: both eyes BID  Lubricating ointment both eyes at night  Artificial tears both eyes    Ocular Surgical History:  11/9/2001  - LEFT RD repair ([facility] [place] [facility])  3/20/2007  - LEFT CEIOL  12/19/2007  - RIGHT CEIOL  12/22/2008  - RIGHT RD repair ([facility])      Imaging:  OCT Macula 07/21/22  OD: ERM with foveal contour lost 2/2 central IRF, increased from previous ( ->391), no drusen, thin choroid, PHF   OS: mild ERM with blunting of foveal contour, no fluid, choroid thin, no drusen, hyaloid     Assessment/plan:    1. Cystoid Macular Edema Right Eye   - Epiretinal membrane noted on OCT, also on left eye    - confirmed by IVFA again today.  No polyps on ICG   - 11/2021 & 1/2022 received two injections - was told there was some improvement   - OCT with worsened IRF today    - Consider Ozurdex if increased dose of ketorolac does not work     2. Corneal scar right eye, stable  - follows with Dr. Maria, Scleral lens as needed for vision but not currently wearing    3. Thyroid eye disease, quiet  -  H/o multiple lid surgeries right eye      4. H/o Retinal detachment  and Epiretinal membrane Both  eyes   - s/p RIGHT SB/PPV/EL (~2007)  - s/p LEFT ?SB (~2005)  - monitor by Optical Coherence Tomography Scan     5. 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 Ellis       Return to clinic one  year for Exam/OCT         Ramandeep Guzman MD PhD.  Professor & Chair

## 2023-05-11 NOTE — NURSING NOTE
Chief Complaints and History of Present Illnesses   Patient presents with     Dry Eye(s) Both Eyes     Chief Complaint(s) and History of Present Illness(es)     Dry Eye(s) Both Eyes            Laterality: both eyes    Associated symptoms: dryness and blurred vision.  Negative for eye pain, burning and itching    Context: dry eyes    Pain scale: 0/10          Comments    Pt states she thinks her cataract is getting worse.   Pt says the bromfenac affected her asthma so she quit taking in December.   She tried Prolensa and it affected her asthma the same way so she also stopped taking.     Drops:   Refresh Preservative free q1-2 h OU  Refresh PM at bedtime OU  Restasis BID each eye  Ketorolac BID right eye    HERNAN HUTCHINS COA May 11, 2023 11:49 AM

## 2023-05-11 NOTE — PROGRESS NOTES
Annabelle Newsome is a 75 year old female presenting for 1 year  follow up.      Interval history 05/11/23:  No visual complaints. Has intermittent blurry vision that improves with blinking. Vision stable today.       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 Scar is too deep for PTK (~40% ?). Patient doing well at this time and would like to continue observation.   - K transplant may make binocular diplopia worse, but would likely help monocular diplopia/glare  - Not interested at this time    # Dry eyes each eye   - stable doing well, no PEE on exam 05/11/23    - continue restasis BID OU   - continue PF tears q~2 hours each eye.   - Emphasized need for art tears.    #. Thyroid eye disease  - H/o multiple lid surgeries right eye    - last visit with Caleb - decided not to pursue any surgery at this time 5/2019  - not active today     #. Esotropia and right hypertropia  - Right esotropia, strab surgery not recommended, seen by Dr. Ellis 5/21/19  - Dr. Arceo recommended trial of blurred +20 CL versus black occluder lens, patient did not find it helpful and is no longer wearing it    #. H/o Retinal detachment  Both eyes   - follows with retina    # Hx of CME, right eye  - follows w/ Dr. Dewitt  - on ketorolac BID     # ERM  - non-surgical per retina team    - F/u cornea 1 year, sooner PRN  - Seeing [facility] today for follow-up      Sarah Gustafson MD  Resident Physician, PGY-3  Department of Ophthalmology    Attending Physician Attestation:  Complete documentation of historical and exam elements from today's encounter can be found in the full encounter summary report (not reduplicated in this progress note).  I personally obtained the chief complaint(s) and history of present illness.  I confirmed and edited as necessary the review of systems, past medical/surgical history, family history, social history, and examination findings as documented by others; and I examined the patient myself.  I personally reviewed the relevant tests, images, and reports as documented above.  I formulated and edited as necessary the assessment and plan and discussed the findings and management plan with the patient and family. - Delfino Silverio MD

## 2023-05-11 NOTE — NURSING NOTE
Chief Complaints and History of Present Illnesses   Patient presents with     Cystoid Macular Edema Follow Up     Chief Complaint(s) and History of Present Illness(es)     Cystoid Macular Edema Follow Up            Laterality: right eye    Onset: gradual    Onset: 1 year ago          Comments    Pt states she thinks her cataract is getting worse.   Pt says the bromfenac affected her asthma so she quit taking in December.   She tried Prolensa and it affected her asthma the same way so she also stopped taking.     Drops:   Refresh Preservative free q1-2 h OU  Refresh PM at bedtime OU  Restasis BID each eye  Ketorolac BID right eye    HERNAN HUTCHINS COA May 11, 2023 11:49 AM

## 2023-06-02 ENCOUNTER — HEALTH MAINTENANCE LETTER (OUTPATIENT)
Age: 78
End: 2023-06-02

## 2023-08-21 ENCOUNTER — TELEPHONE (OUTPATIENT)
Dept: OPHTHALMOLOGY | Facility: CLINIC | Age: 78
End: 2023-08-21
Payer: MEDICARE

## 2023-09-26 DIAGNOSIS — H35.351 CYSTOID MACULAR EDEMA, RIGHT: Primary | ICD-10-CM

## 2023-10-05 ENCOUNTER — OFFICE VISIT (OUTPATIENT)
Dept: OPHTHALMOLOGY | Facility: CLINIC | Age: 78
End: 2023-10-05
Attending: OPHTHALMOLOGY
Payer: MEDICARE

## 2023-10-05 DIAGNOSIS — H35.351 CYSTOID MACULAR EDEMA, RIGHT: ICD-10-CM

## 2023-10-05 PROCEDURE — 92134 CPTRZ OPH DX IMG PST SGM RTA: CPT | Performed by: OPHTHALMOLOGY

## 2023-10-05 PROCEDURE — 99213 OFFICE O/P EST LOW 20 MIN: CPT | Mod: GC | Performed by: OPHTHALMOLOGY

## 2023-10-05 PROCEDURE — G0463 HOSPITAL OUTPT CLINIC VISIT: HCPCS | Mod: 25 | Performed by: OPHTHALMOLOGY

## 2023-10-05 ASSESSMENT — CONF VISUAL FIELD
OS_INFERIOR_TEMPORAL_RESTRICTION: 0
OS_NORMAL: 1
OD_INFERIOR_NASAL_RESTRICTION: 0
OS_INFERIOR_NASAL_RESTRICTION: 0
OD_NORMAL: 1
OS_SUPERIOR_TEMPORAL_RESTRICTION: 0
OD_INFERIOR_TEMPORAL_RESTRICTION: 0
OD_SUPERIOR_NASAL_RESTRICTION: 0
OD_SUPERIOR_TEMPORAL_RESTRICTION: 0
OS_SUPERIOR_NASAL_RESTRICTION: 0

## 2023-10-05 ASSESSMENT — VISUAL ACUITY
OD_PH_SC+: -2
OS_SC+: -1
OD_SC+: -1
OS_SC: 20/20
OD_SC: 20/80
METHOD: SNELLEN - LINEAR
OD_PH_SC: 20/70

## 2023-10-05 ASSESSMENT — EXTERNAL EXAM - RIGHT EYE: OD_EXAM: NO PROPTOSIS OR SCLERAL SHOW

## 2023-10-05 ASSESSMENT — EXTERNAL EXAM - LEFT EYE: OS_EXAM: NO PROPTOSIS OR SCLERAL SHOW

## 2023-10-05 ASSESSMENT — CUP TO DISC RATIO
OS_RATIO: 0.5
OD_RATIO: 0.4

## 2023-10-05 ASSESSMENT — TONOMETRY
OS_IOP_MMHG: 14
OD_IOP_MMHG: 14
IOP_METHOD: TONOPEN

## 2023-10-05 NOTE — PROGRESS NOTES
I have confirmed the patient's and reviewed Past Medical History, Past Surgical History, Social History, Family History, Problem List, Medication List and agree with Tech note.    CC: cystoid macular edema right eye, follow up     Interval history: worsening vision in the left eye . There is not distortion as much as there is blurriness.  It is difficult to discern whether it is the right or left, but she is most concerned about the left eye.     HPI: Annabelle Newsome is a 78 year old female here for retina follow up.     Referred by Dr. Maria, noted to have cystoid macular edema with Epiretinal membrane on visit in 09/2017,  Patient evan in [place], [place] and follows with [facility] in [place] when she is there.      MEDS: OD  Ketorolac once daily right eye  Restasis: both eyes BID  Lubricating ointment both eyes at night  Artificial tears both eyes    Ocular Surgical History:  11/9/2001  - LEFT RD repair ([place] [place] [facility])  3/20/2007  - LEFT CEIOL  12/19/2007  - RIGHT CEIOL  12/22/2008  - RIGHT RD repair ([facility])      Imaging:  OCT Macula 07/21/22  OD: ERM with foveal contour lost 2/2 central IRF, increased from previous ( ->391), no drusen, thin choroid, PHF   OS: mild ERM with blunting of foveal contour, no fluid, choroid thin, no drusen, hyaloid     Assessment/plan:    1. Cystoid Macular Edema Right Eye   - Epiretinal membrane noted on OCT, also on left eye    - confirmed by IVFA previously.  No polyps on ICG   - 11/2021 & 1/2022 received two injections - was told there was some improvement   - OCT with stable IRF today    - Consider Ozurdex if increased dose of ketorolac does not work     2. Corneal scar right eye, stable  - follows with Dr. Maria, Scleral lens as needed for vision but not currently wearing    3. Thyroid eye disease, quiet  -  H/o multiple lid surgeries right eye      4. H/o Retinal detachment  and Epiretinal membrane Both eyes    - s/p RIGHT SB/PPV/EL (~2007)  - s/p LEFT ?SB (~2005)  - monitor by Optical Coherence Tomography Scan     5. 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 Ellis       Return to clinic 6 months for Exam/OCT     Jann smith md  [id number]    Attestation:  I have seen and examined the patient with Dr. Jann Smith MD and agree with the findings in this note, as well as the interpretations of the diagnostic tests.      Ramandeep Guzman MD PhD.  Professor & Chair      Ramandeep Guzman MD PhD.  Professor & Chair

## 2023-10-05 NOTE — NURSING NOTE
Chief Complaints and History of Present Illnesses   Patient presents with    Cystoid Macular Edema Follow Up     Chief Complaint(s) and History of Present Illness(es)       Cystoid Macular Edema Follow Up              Laterality: right eye    Onset: 4 months ago              Comments    Pt. States that she is doing well. VA seems stable BE. No pain BE. Still seeing intermittent double images. Still a lot of dryness BE. No flashes or floaters BE.   Yolie Rodriguez COT 2:50 PM October 5, 2023

## 2023-12-04 DIAGNOSIS — H35.351 CYSTOID MACULAR EDEMA, RIGHT: ICD-10-CM

## 2023-12-05 NOTE — TELEPHONE ENCOUNTER
ketorolac (ACULAR) 0.5 % ophthalmic solution 10 mL 3 7/21/2022       Last Office Visit: 10/5/23  Future Office visit:         Routing refill request to provider for review/approval because:  Drug not on the refill protocol     Stacia Nunn RN

## 2023-12-09 RX ORDER — KETOROLAC TROMETHAMINE 5 MG/ML
1 SOLUTION OPHTHALMIC 2 TIMES DAILY
Qty: 10 ML | Refills: 3 | Status: SHIPPED | OUTPATIENT
Start: 2023-12-09 | End: 2024-07-06

## 2024-04-24 DIAGNOSIS — H35.351 CYSTOID MACULAR EDEMA, RIGHT: Primary | ICD-10-CM

## 2024-05-09 ENCOUNTER — OFFICE VISIT (OUTPATIENT)
Dept: OPHTHALMOLOGY | Facility: CLINIC | Age: 79
End: 2024-05-09
Attending: OPHTHALMOLOGY
Payer: MEDICARE

## 2024-05-09 DIAGNOSIS — H35.351 CYSTOID MACULAR EDEMA, RIGHT: ICD-10-CM

## 2024-05-09 PROCEDURE — G0463 HOSPITAL OUTPT CLINIC VISIT: HCPCS

## 2024-05-09 PROCEDURE — 92134 CPTRZ OPH DX IMG PST SGM RTA: CPT

## 2024-05-09 PROCEDURE — 92134 CPTRZ OPH DX IMG PST SGM RTA: CPT | Mod: 26

## 2024-05-09 PROCEDURE — 99214 OFFICE O/P EST MOD 30 MIN: CPT | Mod: GC

## 2024-05-09 RX ORDER — PREDNISOLONE ACETATE 10 MG/ML
1-2 SUSPENSION/ DROPS OPHTHALMIC 2 TIMES DAILY
Qty: 5 ML | Refills: 1 | Status: SHIPPED | OUTPATIENT
Start: 2024-05-09 | End: 2024-08-27 | Stop reason: DRUGHIGH

## 2024-05-09 RX ORDER — LOSARTAN POTASSIUM 50 MG/1
1 TABLET ORAL
COMMUNITY
Start: 2024-01-19 | End: 2024-08-27 | Stop reason: DRUGHIGH

## 2024-05-09 ASSESSMENT — EXTERNAL EXAM - RIGHT EYE: OD_EXAM: NO PROPTOSIS OR SCLERAL SHOW

## 2024-05-09 ASSESSMENT — SLIT LAMP EXAM - LIDS: COMMENTS: MGD, TELANGECTASIA

## 2024-05-09 ASSESSMENT — VISUAL ACUITY
OD_PH_SC: 20/70
OD_SC+: +1
OS_SC: 20/20
OD_PH_SC+: -2
OD_SC: 20/100
METHOD: SNELLEN - LINEAR
OS_SC+: -1

## 2024-05-09 ASSESSMENT — CUP TO DISC RATIO
OS_RATIO: 0.5
OD_RATIO: 0.4

## 2024-05-09 ASSESSMENT — TONOMETRY
OD_IOP_MMHG: 17
OS_IOP_MMHG: 14
IOP_METHOD: TONOPEN

## 2024-05-09 ASSESSMENT — REFRACTION_WEARINGRX
OD_ADD: +2.50
OS_AXIS: 105
OS_CYLINDER: +0.50
OS_SPHERE: -0.75
OD_SPHERE: PLANO
OS_ADD: +2.50

## 2024-05-09 ASSESSMENT — CONF VISUAL FIELD
OD_INFERIOR_TEMPORAL_RESTRICTION: 0
OD_SUPERIOR_NASAL_RESTRICTION: 0
OS_SUPERIOR_TEMPORAL_RESTRICTION: 0
OD_SUPERIOR_TEMPORAL_RESTRICTION: 0
METHOD: COUNTING FINGERS
OS_SUPERIOR_NASAL_RESTRICTION: 0
OS_INFERIOR_TEMPORAL_RESTRICTION: 0
OD_INFERIOR_NASAL_RESTRICTION: 0
OS_NORMAL: 1
OS_INFERIOR_NASAL_RESTRICTION: 0
OD_NORMAL: 1

## 2024-05-09 ASSESSMENT — EXTERNAL EXAM - LEFT EYE: OS_EXAM: NO PROPTOSIS OR SCLERAL SHOW

## 2024-05-09 NOTE — PROGRESS NOTES
CC: follow up CME    HPI: Patient previously following with Dr. Quintanilla for CME in the right eye, last seen 6 months ago. No changes in vision since last visit. She has a stable floater in the left eye that is chronic. Using drops as prescribed. She was seen by a local eye doctor in [place] this winter and did get two eye injections to the right eye, one in Dec 2023 and another in March 2024. She was not having worsening vision at that time and she did not notice a difference with the injections    Current medications:  - Ketorolac twice daily right eye  - Restasis BID both eyes  - Ointment at bedtime  - AT both eyes    Ocular Hx:  - LEFT RD repair ([place] 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 Ellis    PMHx:   HTN  Asthma on Advair (corticosteroid inhaler)  Hypothyroidism  Tobacco use      Imaging:    OCT: 05/09/2024  Right eye: ERM with flattened foveal contour and IRF,  ->363, no drusen, thin choroid, PHF , stable  Left eye: mild ERM with normal foveal contour, no fluid, choroid thin, no drusen, hyaloid , stable    Retina Laser procedures:  None    Intravitreal injections:  2 injections right eye in 2021 and 2022 in [place]  Recently another 2 injections right eye Dec 2023 and March 2024    Assessment/ Plan: 05/09/2024       # Cystoid Macular Edema Right Eye  - Epiretinal membrane noted on OCT, also on left eye   - confirmed by IVFA previously.  No polyps on ICG  - 11/2021 & 1/2022 received two injections in [place]- was told there was some improvement  - OCT with stable minimal IRF today     - Received 2 injections in [place] over the winter most recently March 2024. Unclear if she was having worsening IRF. Patient did not notice change in vision before or after  injections  - will request outside records  - continue Ketorolac twice daily right eye  - start predforte 1 drop BID OD an dfu in 4 weeks with VA , IOP and OCT macula OU     # Corneal scar right eye, stable  - follows with Dr. 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 Caleb Aparicio MD  PGY3 Ophthalmology Resident  [facility]    Amy Andujar MD     Medical Retina  [facility]     Attending Physician Attestation:  Complete documentation of historical and exam elements from today's encounter can be found in the full encounter summary report (not reduplicated in this progress note).  I personally obtained the chief complaint(s) and history of present illness.  I confirmed and edited as necessary the review of systems, past medical/surgical history, family history, social history, and examination findings as documented by others; and I examined the patient myself.  I personally reviewed the relevant tests, images, and reports as documented above.  I formulated and edited as necessary the assessment and plan and discussed the findings and management plan with the patient and family. Amy Andujar MD

## 2024-05-09 NOTE — NURSING NOTE
Chief Complaints and History of Present Illnesses   Patient presents with    Follow Up      cystoid macular edema right eye     Chief Complaint(s) and History of Present Illness(es)       Follow Up              Comments:  cystoid macular edema right eye              Comments    Pt states no change in VA since last visit  States floater in the left eye same as last visit   No flashes, eye pain or tearing    Goldie Lopez COT 7:13 AM May 9, 2024

## 2024-05-17 ENCOUNTER — TRANSFERRED RECORDS (OUTPATIENT)
Dept: HEALTH INFORMATION MANAGEMENT | Facility: CLINIC | Age: 79
End: 2024-05-17
Payer: MEDICARE

## 2024-06-10 ENCOUNTER — OFFICE VISIT (OUTPATIENT)
Dept: OPHTHALMOLOGY | Facility: CLINIC | Age: 79
End: 2024-06-10
Payer: MEDICARE

## 2024-06-10 DIAGNOSIS — H35.351 CYSTOID MACULAR EDEMA, RIGHT: Primary | ICD-10-CM

## 2024-06-10 PROCEDURE — 92134 CPTRZ OPH DX IMG PST SGM RTA: CPT

## 2024-06-10 PROCEDURE — G0463 HOSPITAL OUTPT CLINIC VISIT: HCPCS

## 2024-06-10 PROCEDURE — 99213 OFFICE O/P EST LOW 20 MIN: CPT

## 2024-06-10 RX ORDER — KETOROLAC TROMETHAMINE 5 MG/ML
1 SOLUTION OPHTHALMIC 2 TIMES DAILY
Qty: 10 ML | Refills: 3 | Status: SHIPPED | OUTPATIENT
Start: 2024-06-10

## 2024-06-10 ASSESSMENT — SLIT LAMP EXAM - LIDS: COMMENTS: MGD, TELANGECTASIA

## 2024-06-10 ASSESSMENT — VISUAL ACUITY
OS_SC+: -3
OD_PH_SC: 20/70
OS_SC: 20/20
OD_PH_SC+: +2
METHOD: SNELLEN - LINEAR
OD_SC+: +1
OD_SC: 20/100

## 2024-06-10 ASSESSMENT — TONOMETRY
IOP_METHOD: TONOPEN
OD_IOP_MMHG: 12
OS_IOP_MMHG: 9

## 2024-06-10 ASSESSMENT — EXTERNAL EXAM - RIGHT EYE: OD_EXAM: NO PROPTOSIS OR SCLERAL SHOW

## 2024-06-10 ASSESSMENT — CUP TO DISC RATIO
OS_RATIO: 0.5
OD_RATIO: 0.4

## 2024-06-10 ASSESSMENT — EXTERNAL EXAM - LEFT EYE: OS_EXAM: NO PROPTOSIS OR SCLERAL SHOW

## 2024-06-10 NOTE — PROGRESS NOTES
CC: follow up CME    HPI: Patient previously following with Dr. Quintanilla for CME in the right eye, last seen 6 months ago. No changes in vision since last visit. She has a stable floater in the left eye that is chronic. Using drops as prescribed. She was seen by a local eye doctor in [place] this winter and did get two Avastin injections to the right eye, one in Dec 2023 and another in March 2024. She was not having worsening vision at that time and she did not notice a difference with the injections    Current medications:  - Ketorolac twice daily right eye  - Restasis BID both eyes  - Ointment at bedtime  - AT both eyes    Ocular Hx:  - LEFT RD repair ([place] 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 Ellis    PMHx:   HTN  Asthma on Advair (corticosteroid inhaler)  Hypothyroidism  Tobacco use      Imaging:    OCT: 06/10/2024  Right eye: ERM with flattened foveal contour and significant improvement in IRF no drusen, thin choroid, PHF , stable  Left eye: mild ERM with normal foveal contour, no fluid, choroid thin, no drusen, hyaloid , stable    Retina Laser procedures:  None    Intravitreal injections:  2 injections right eye in 2021 and 2022 in [place]  Recently another 2 injections right eye Dec 2023 and March 2024    Assessment/ Plan: 06/10/2024       # Cystoid Macular Edema Right Eye   chronic - earliest OCTs from 2017 show more fluid than current.   - fluid significantly improved on OCTs btw 10/2020 and 4/2021. Vision did not improve. When her CME was nearly resolved she was on Durezol daily to 3x/week, Dorzolamide BID, and ketorolac BID.   - Epiretinal membrane noted on OCT, also on left eye   - confirmed by IVFA previously.  No polyps on ICG  Etiology is unclear. DDx includes  vascular (no h/o RVO), inflammatory (intermediate uveitis, [place] Karina), ERM (current ERM is very mild), degenerative.   - 11/2021 & 1/2022 received two Avastin injections in [place]- was told there was some improvement  - OCT with improved IRF today OD however vision has not changed a lot, will continue to treat and see if vision will improve after resolution of IRF, patient does have a central scar which is also contributing to poor central vision and she also had diplopia that is helping her with suppressing the image  - Received 2 injections in [place] over the winter most recently March 2024. Unclear if she was having worsening IRF. Patient did not notice change in vision before or after injections  - continue Ketorolac twice daily right eye  - increase predforte 1 drop QID  OD and fu in 4 weeks with VA , IOP and OCT macula OU   - follow-up in 4 weeks VA, OCT macula OU     # Corneal scar right eye, stable  - follows with Dr. 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 Caleb Andujar MD     Medical Retina  [facility]     Attending Physician Attestation:  Complete documentation of historical and exam elements from today's encounter can be found in the full encounter summary report (not reduplicated in this progress note).  I personally obtained the chief complaint(s) and history of present illness.  I confirmed and edited as necessary the review of systems, past  medical/surgical history, family history, social history, and examination findings as documented by others; and I examined the patient myself.  I personally reviewed the relevant tests, images, and reports as documented above.  I formulated and edited as necessary the assessment and plan and discussed the findings and management plan with the patient and family. Amy Andujar MD

## 2024-06-10 NOTE — NURSING NOTE
Chief Complaints and History of Present Illnesses   Patient presents with    Cystoid Macular Edema Follow Up     1 month follow up Cystoid macular edema, right eye     Chief Complaint(s) and History of Present Illness(es)       Cystoid Macular Edema Follow Up              Associated symptoms: dryness.  Negative for eye pain, flashes and floaters    Treatments tried: eye drops and artificial tears    Pain scale: 0/10    Comments: 1 month follow up Cystoid macular edema, right eye              Comments    Pt states VA has been stable since last visit.   No eye pain, or discomfort.   No new flashes or floaters.   No new concerns.    Ocular Meds:  - Prednisolone BID right eye   - Ketorolac twice daily right eye  - Restasis BID both eyes  - Ointment at bedtime  - AT both eyes    Jarvis Ho 2:39 PM Prudence 10, 2024

## 2024-06-28 DIAGNOSIS — H35.351 CYSTOID MACULAR EDEMA, RIGHT: Primary | ICD-10-CM

## 2024-06-29 ENCOUNTER — HEALTH MAINTENANCE LETTER (OUTPATIENT)
Age: 79
End: 2024-06-29

## 2024-07-06 ENCOUNTER — OFFICE VISIT (OUTPATIENT)
Dept: FAMILY MEDICINE | Facility: OTHER | Age: 79
End: 2024-07-06
Payer: MEDICARE

## 2024-07-06 VITALS
OXYGEN SATURATION: 98 % | DIASTOLIC BLOOD PRESSURE: 78 MMHG | HEIGHT: 65 IN | SYSTOLIC BLOOD PRESSURE: 160 MMHG | TEMPERATURE: 98.2 F | WEIGHT: 113 LBS | BODY MASS INDEX: 18.83 KG/M2 | HEART RATE: 72 BPM | RESPIRATION RATE: 16 BRPM

## 2024-07-06 DIAGNOSIS — B37.31 YEAST INFECTION OF THE VAGINA: Primary | ICD-10-CM

## 2024-07-06 LAB
BACTERIAL VAGINOSIS VAG-IMP: NEGATIVE
CANDIDA DNA VAG QL NAA+PROBE: NOT DETECTED
CANDIDA GLABRATA / CANDIDA KRUSEI DNA: NOT DETECTED
T VAGINALIS DNA VAG QL NAA+PROBE: NOT DETECTED

## 2024-07-06 PROCEDURE — 99213 OFFICE O/P EST LOW 20 MIN: CPT | Performed by: STUDENT IN AN ORGANIZED HEALTH CARE EDUCATION/TRAINING PROGRAM

## 2024-07-06 PROCEDURE — 0352U MULTIPLEX VAGINAL PANEL BY PCR: CPT | Mod: ZL | Performed by: STUDENT IN AN ORGANIZED HEALTH CARE EDUCATION/TRAINING PROGRAM

## 2024-07-06 PROCEDURE — G0463 HOSPITAL OUTPT CLINIC VISIT: HCPCS

## 2024-07-06 RX ORDER — FLUCONAZOLE 150 MG/1
150 TABLET ORAL
Qty: 3 TABLET | Refills: 0 | Status: SHIPPED | OUTPATIENT
Start: 2024-07-06 | End: 2024-07-13

## 2024-07-06 RX ORDER — VITAMIN A 2400 MCG
CAPSULE ORAL
COMMUNITY

## 2024-07-06 RX ORDER — NYSTATIN 100000 U/G
CREAM TOPICAL 2 TIMES DAILY
Qty: 30 G | Refills: 0 | Status: SHIPPED | OUTPATIENT
Start: 2024-07-06 | End: 2024-07-13

## 2024-07-06 RX ORDER — ESTROGEN,CON/M-PROGEST ACET 0.3-1.5MG
1 TABLET ORAL DAILY
COMMUNITY

## 2024-07-06 ASSESSMENT — PAIN SCALES - GENERAL: PAINLEVEL: NO PAIN (0)

## 2024-07-06 NOTE — NURSING NOTE
"Chief Complaint   Patient presents with    Vaginal Problem     itching     Patient tx with otc creams the past couple of months and yeast resolved  [place], [place] summer months.  Yeast has returned 2 days ago.    Initial BP (!) 160/78   Pulse 72   Temp 98.2  F (36.8  C) (Tympanic)   Resp 16   Ht 1.651 m (5' 5\")   Wt 51.3 kg (113 lb)   LMP  (LMP Unknown)   SpO2 98%   BMI 18.80 kg/m   Estimated body mass index is 18.8 kg/m  as calculated from the following:    Height as of this encounter: 1.651 m (5' 5\").    Weight as of this encounter: 51.3 kg (113 lb).     Advance Care Directive on file? no      FOOD SECURITY SCREENING QUESTIONS:    The next two questions are to help us understand your food security.  If you are feeling you need any assistance in this area, we have resources available to support you today.    Hunger Vital Signs:  Within the past 12 months we worried whether our food would run out before we got money to buy more. Never  Within the past 12 months the food we bought just didn't last and we didn't have money to get more. Never  Prudence Vaughn LPN,LPN on 7/6/2024 at 9:12 AM      Prudence Vaughn LPN       "

## 2024-07-06 NOTE — PROGRESS NOTES
"  Assessment & Plan     (B37.31) Yeast infection of the vagina  (primary encounter diagnosis)    Comment: Vaginal itching, possible yeast infection.  Vaginal swab was negative today.  No obvious areas of erythema.  No obvious lichen.    Plan: Multiplex Vaginal Panel by PCR, fluconazole         (DIFLUCAN) 150 MG tablet, nystatin (MYCOSTATIN)        [id number] UNIT/GM external cream, Ob/Gyn         Referral     Recommended treating with Diflucan x 3 doses.  Nystatin externally.  Following up with gynecology if not improving.  Return to rapid clinic or ER if worsening or changing.  She is comfortable and agreeable with this plan.        Subjective   Pat is a 78 year old, presenting for the following health issues:  Vaginal Problem (itching)    HPI     Patient presents today with concerns of vaginal itching.  She states the left side seems to be worse than the right.  She has had it for the last couple of months, she has been using Monistat over-the-counter on the area.  She denies any discharge from the vaginal area.  She has not had any urinary symptoms over the past few days.  She denies being sexually active.    She does spend her summers here in [place] in [place].      Review of Systems  Constitutional, HEENT, cardiovascular, pulmonary, gi and gu systems are negative, except as otherwise noted.        Objective    BP (!) 160/78   Pulse 72   Temp 98.2  F (36.8  C) (Tympanic)   Resp 16   Ht 1.651 m (5' 5\")   Wt 51.3 kg (113 lb)   LMP  (LMP Unknown)   SpO2 98%   BMI 18.80 kg/m    Body mass index is 18.8 kg/m .    Physical Exam   GENERAL: alert and no distress  RESP: no increased work of breathing  ABDOMEN: soft, non-tender   (female): normal female external genitalia, large external lips bilaterally without any notable atrophy/white areas, redness, normal urethral meatus, normal vaginal mucosa  MS: no gross musculoskeletal defects noted, no edema    Results for orders placed or performed in " visit on 07/06/24   Multiplex Vaginal Panel by PCR     Status: Normal    Specimen: Vagina; Swab   Result Value Ref Range    Bacterial Vaginosis Organism DNA Negative Negative    Candida Group DNA Not Detected Not Detected    Candida glabrata / Liane krusei DNA Not Detected Not Detected    Trichomonas vaginalis DNA Not Detected Not Detected    Narrative    The Xpert  Xpress MVP test, performed on the [employer], is an automated, qualitative in vitro diagnostic test for the detection of DNA targets from anaerobic bacteria associated with bacterial vaginosis, Candida species associated with vulvovaginal candidiasis, and Trichomonas vaginalis. The assay uses clinician-collected and self-collected vaginal swabs from patients who are symptomatic for vaginitis/ vaginosis. The Xpert  Xpress MVP test utilizes real-time polymerase chain reaction (PCR) for the amplification of specific DNA targets and utilizes fluorogenic target-specific hybridization probes to detect and differentiate DNA. It is intended to aid in the diagnosis of vaginal infections in women with a clinical presentation consistent with bacterial vaginosis, vulvovaginal candidiasis, or trichomoniasis.   The assay targets three anaerobic microorgansims that are associated with bacterial vaginosis (BV). Other organisms that are not detected by the Xpert  Xpress MVP test have also been reported to be associated with BV. The BV organism and Candida species targets of the Xpert  Xpress MVP test can be commensal in women; positive results must be considered in conjunction with other clinical and patient information to determine the disease status.           Signed Electronically by: Leah Bravo PA-C

## 2024-07-06 NOTE — PATIENT INSTRUCTIONS
Yeast Infection    Diflucan 1 tablet every 3 days for 3 doses.    Nystatin cream over the area.    Follow-up with gynecology if not improving.

## 2024-07-11 ENCOUNTER — OFFICE VISIT (OUTPATIENT)
Dept: OPHTHALMOLOGY | Facility: CLINIC | Age: 79
End: 2024-07-11
Payer: MEDICARE

## 2024-07-11 DIAGNOSIS — H17.9 CORNEAL SCAR: Primary | ICD-10-CM

## 2024-07-11 DIAGNOSIS — H35.351 CYSTOID MACULAR EDEMA, RIGHT: ICD-10-CM

## 2024-07-11 PROCEDURE — G0463 HOSPITAL OUTPT CLINIC VISIT: HCPCS

## 2024-07-11 PROCEDURE — 99214 OFFICE O/P EST MOD 30 MIN: CPT

## 2024-07-11 PROCEDURE — 92134 CPTRZ OPH DX IMG PST SGM RTA: CPT

## 2024-07-11 RX ORDER — PREDNISOLONE ACETATE 10 MG/ML
1-2 SUSPENSION/ DROPS OPHTHALMIC 3 TIMES DAILY
Qty: 15 ML | Refills: 4 | Status: SHIPPED | OUTPATIENT
Start: 2024-07-11

## 2024-07-11 ASSESSMENT — TONOMETRY
OD_IOP_MMHG: 18
IOP_METHOD: TONOPEN
OS_IOP_MMHG: 14

## 2024-07-11 ASSESSMENT — EXTERNAL EXAM - LEFT EYE: OS_EXAM: NO PROPTOSIS OR SCLERAL SHOW

## 2024-07-11 ASSESSMENT — SLIT LAMP EXAM - LIDS: COMMENTS: MGD, TELANGECTASIA

## 2024-07-11 ASSESSMENT — VISUAL ACUITY
OD_SC+: -1
OS_SC+: -2
OD_SC: 20/80
METHOD: SNELLEN - LINEAR
OS_SC: 20/20

## 2024-07-11 ASSESSMENT — CUP TO DISC RATIO
OD_RATIO: 0.4
OS_RATIO: 0.5

## 2024-07-11 ASSESSMENT — EXTERNAL EXAM - RIGHT EYE: OD_EXAM: NO PROPTOSIS OR SCLERAL SHOW

## 2024-07-11 NOTE — NURSING NOTE
Chief Complaints and History of Present Illnesses   Patient presents with    Cystoid Macular Edema Follow Up     Chief Complaint(s) and History of Present Illness(es)       Cystoid Macular Edema Follow Up              Laterality: right eye    Onset: 2              Comments    Pt. States that she is doing well. No change in VA BE. No pain BE. No flashes or floaters BE.   Yolie Rodriguez COT 8:32 AM July 11, 2024

## 2024-07-11 NOTE — PROGRESS NOTES
CC: follow up CME    HPI: Patient previously following with Dr. Quintanilla for CME in the right eye, last seen 6 months ago. No changes in vision since last visit. She has a stable floater in the left eye that is chronic. Using drops as prescribed. She was seen by a local eye doctor in [place] this winter and did get two Avastin injections to the right eye, one in Dec 2023 and another in March 2024. She was not having worsening vision at that time and she did not notice a difference with the injections    Current medications:  - Ketorolac twice daily right eye  - Restasis BID both eyes  - Ointment at bedtime  - AT both eyes    Ocular Hx:  - LEFT RD repair ([facility] 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 Ellis    PMHx:   HTN  Asthma on Advair (corticosteroid inhaler)  Hypothyroidism  Tobacco use      Imaging:    OCT: 07/11/2024  Right eye: ERM with flattened foveal contour and significant improvement in IRF no drusen, thin choroid, PHF , stable  Left eye: mild ERM with normal foveal contour, no fluid, choroid thin, no drusen, hyaloid , stable    Retina Laser procedures:  None    Intravitreal injections:  2 injections right eye in 2021 and 2022 in [place]  Recently another 2 injections right eye Dec 2023 and March 2024    Assessment/ Plan: 07/11/2024       # Cystoid Macular Edema Right Eye   chronic - earliest OCTs from 2017 show more fluid than current.   - fluid significantly improved on OCTs btw 10/2020 and 4/2021. Vision did not improve. When her CME was nearly resolved she was on Durezol daily to 3x/week, Dorzolamide BID, and ketorolac BID.   - Epiretinal membrane noted on OCT, also on left eye   - confirmed by IVFA previously.  No polyps on ICG  Etiology is unclear. DDx includes  vascular (no h/o RVO), inflammatory (intermediate uveitis, [place] Karina), ERM (current ERM is very mild), degenerative.   - 11/2021 & 1/2022 received two Avastin injections in [place]- was told there was some improvement   -Patient does have a central scar which is also contributing to poor central vision and she also had diplopia that is helping her with suppressing the image  - Received 2 injections in [place] over the winter most recently March 2024. Unclear if she was having worsening IRF. Patient did not notice change in vision before or after injections  - currently on  Ketorolac twice daily right eye and  predforte 1 drop QID  OD   - OCT shows significant improvement in IRF compared to 4 weeks ago on the higher dose predF however vision is stable at 20/80   - will start a very slow taper of steroids to see the least amount of eye drops needed to control the CME, make predforte 1 drop TID   To RIGHt eye and follow-up in 6 weeks with OCT macula OU and decide if can taper more    # Corneal scar right eye, stable  - follows with Dr. 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 Ellis     Follow-up in 6 weeks OCT macula OU     Amy Andujar MD     Medical Retina  [facility]     Attending Physician Attestation:  Complete documentation of historical and exam elements from today's encounter can be found in the full encounter summary report (not reduplicated in this progress note).  I  personally obtained the chief complaint(s) and history of present illness.  I confirmed and edited as necessary the review of systems, past medical/surgical history, family history, social history, and examination findings as documented by others; and I examined the patient myself.  I personally reviewed the relevant tests, images, and reports as documented above.  I formulated and edited as necessary the assessment and plan and discussed the findings and management plan with the patient and family. Amy Andujar MD

## 2024-07-30 DIAGNOSIS — H35.351 CYSTOID MACULAR EDEMA, RIGHT: Primary | ICD-10-CM

## 2024-08-15 ENCOUNTER — OFFICE VISIT (OUTPATIENT)
Dept: OPHTHALMOLOGY | Facility: CLINIC | Age: 79
End: 2024-08-15
Payer: MEDICARE

## 2024-08-15 DIAGNOSIS — H35.351 CYSTOID MACULAR EDEMA, RIGHT: ICD-10-CM

## 2024-08-15 PROCEDURE — 99214 OFFICE O/P EST MOD 30 MIN: CPT

## 2024-08-15 PROCEDURE — G0463 HOSPITAL OUTPT CLINIC VISIT: HCPCS

## 2024-08-15 PROCEDURE — 92134 CPTRZ OPH DX IMG PST SGM RTA: CPT

## 2024-08-15 ASSESSMENT — VISUAL ACUITY
OD_SC: 20/80
OD_PH_SC: 20/70
OS_SC: 20/20
METHOD: SNELLEN - LINEAR

## 2024-08-15 ASSESSMENT — SLIT LAMP EXAM - LIDS: COMMENTS: MGD, TELANGECTASIA

## 2024-08-15 ASSESSMENT — CUP TO DISC RATIO
OD_RATIO: 0.4
OS_RATIO: 0.5

## 2024-08-15 ASSESSMENT — TONOMETRY
OD_IOP_MMHG: 20
IOP_METHOD: TONOPEN
OS_IOP_MMHG: 16

## 2024-08-15 ASSESSMENT — EXTERNAL EXAM - LEFT EYE: OS_EXAM: NO PROPTOSIS OR SCLERAL SHOW

## 2024-08-15 ASSESSMENT — EXTERNAL EXAM - RIGHT EYE: OD_EXAM: NO PROPTOSIS OR SCLERAL SHOW

## 2024-08-15 NOTE — PROGRESS NOTES
CC: follow up CME    HPI: Patient previously following with Dr. Quintanilla for CME in the right eye, last seen 6 months ago. No changes in vision since last visit. She has a stable floater in the left eye that is chronic. Using drops as prescribed. She was seen by a local eye doctor in [place] this winter and did get two Avastin injections to the right eye, one in Dec 2023 and another in March 2024. She was not having worsening vision at that time and she did not notice a difference with the injections    Current medications:  - Ketorolac twice daily right eye  - Restasis BID both eyes  - Ointment at bedtime  - AT both eyes    Ocular Hx:  - LEFT RD repair ([place] 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 Ellis    PMHx:   HTN  Asthma on Advair (corticosteroid inhaler)  Hypothyroidism  Tobacco use      Imaging:    OCT: 08/15/2024  Right eye: ERM with flattened foveal contour and continued improvement in IRF no drusen, thin choroid, PHF , stable  Left eye: mild ERM with normal foveal contour, no fluid, choroid thin, no drusen, hyaloid , stable    Retina Laser procedures:  None    Intravitreal injections:  2 injections right eye in 2021 and 2022 in [place]  Recently another 2 injections right eye Dec 2023 and March 2024    Assessment/ Plan: 08/15/2024       # Cystoid Macular Edema Right Eye   chronic - earliest OCTs from 2017 show more fluid than current.   - fluid significantly improved on OCTs btw 10/2020 and 4/2021. Vision did not improve. When her CME was nearly resolved she was on Durezol daily to 3x/week, Dorzolamide BID, and ketorolac BID.   - Epiretinal membrane noted on OCT, also on left eye   - confirmed by IVFA previously.  No polyps on ICG  Etiology is unclear. DDx includes  vascular (no h/o RVO), inflammatory (intermediate uveitis, [place] Karina), ERM (current ERM is very mild), degenerative.   - 11/2021 & 1/2022 received two Avastin injections in [place]- was told there was some improvement   -Patient does have a central scar which is also contributing to poor central vision and she also had diplopia that is helping her with suppressing the image  - Received 2 injections in [place] over the winter most recently March 2024. Unclear if she was having worsening IRF. Patient did not notice change in vision before or after injections  - currently on  Ketorolac twice daily right eye and  predforte 1 drop QID  OD   - OCT shows significant improvement in IRF compared to 4 weeks ago on the higher dose predF however vision is stable at 20/80   - will continue a very slow taper of steroids to see the least amount of eye drops needed to control the CME, make predforte 1 drop BID (was TID)   To RIGHt eye and follow-up in 8 weeks with OCT macula OU and decide if can taper more    # Corneal scar right eye, stable  - follows with Dr. 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 Ellis     Follow-up in 6 weeks OCT macula OU     Amy Andujar MD     Medical Retina  [facility]     Attending Physician Attestation:  Complete documentation of historical and exam elements from today's encounter can be found in the full encounter summary report (not reduplicated in this progress  note).  I personally obtained the chief complaint(s) and history of present illness.  I confirmed and edited as necessary the review of systems, past medical/surgical history, family history, social history, and examination findings as documented by others; and I examined the patient myself.  I personally reviewed the relevant tests, images, and reports as documented above.  I formulated and edited as necessary the assessment and plan and discussed the findings and management plan with the patient and family. Amy Andujar MD

## 2024-08-15 NOTE — NURSING NOTE
Chief Complaints and History of Present Illnesses   Patient presents with    Cystoid Macular Edema Follow Up     Chief Complaint(s) and History of Present Illness(es)       Cystoid Macular Edema Follow Up              Laterality: right eye    Onset: 6 weeks ago              Comments    Pt. States that she is doing well. No change in VA BE. No pain BE. No flashes or new floaters BE.   Yolie Rodriguez COT 11:50 AM August 15, 2024

## 2024-08-27 ENCOUNTER — OFFICE VISIT (OUTPATIENT)
Dept: OBGYN | Facility: OTHER | Age: 79
End: 2024-08-27
Attending: STUDENT IN AN ORGANIZED HEALTH CARE EDUCATION/TRAINING PROGRAM
Payer: MEDICARE

## 2024-08-27 VITALS
DIASTOLIC BLOOD PRESSURE: 74 MMHG | HEART RATE: 60 BPM | WEIGHT: 113 LBS | BODY MASS INDEX: 18.83 KG/M2 | SYSTOLIC BLOOD PRESSURE: 136 MMHG | HEIGHT: 65 IN

## 2024-08-27 DIAGNOSIS — N90.89 VULVAR IRRITATION: ICD-10-CM

## 2024-08-27 DIAGNOSIS — B37.31 YEAST INFECTION OF THE VAGINA: ICD-10-CM

## 2024-08-27 DIAGNOSIS — N39.0 RECURRENT UTI: Primary | ICD-10-CM

## 2024-08-27 PROCEDURE — 87086 URINE CULTURE/COLONY COUNT: CPT | Mod: ZL | Performed by: OBSTETRICS & GYNECOLOGY

## 2024-08-27 PROCEDURE — 51798 US URINE CAPACITY MEASURE: CPT | Performed by: OBSTETRICS & GYNECOLOGY

## 2024-08-27 PROCEDURE — 99204 OFFICE O/P NEW MOD 45 MIN: CPT | Performed by: OBSTETRICS & GYNECOLOGY

## 2024-08-27 PROCEDURE — G0463 HOSPITAL OUTPT CLINIC VISIT: HCPCS | Mod: 25 | Performed by: OBSTETRICS & GYNECOLOGY

## 2024-08-27 RX ORDER — FLUTICASONE PROPIONATE AND SALMETEROL 50; 250 UG/1; UG/1
1 POWDER RESPIRATORY (INHALATION) 2 TIMES DAILY
COMMUNITY
Start: 2024-08-15

## 2024-08-27 RX ORDER — LEVOTHYROXINE SODIUM 100 UG/1
100 TABLET ORAL DAILY
COMMUNITY
Start: 2024-08-23

## 2024-08-27 RX ORDER — CLOBETASOL PROPIONATE 0.5 MG/G
OINTMENT TOPICAL 2 TIMES DAILY
Qty: 30 G | Refills: 1 | Status: SHIPPED | OUTPATIENT
Start: 2024-08-27

## 2024-08-27 RX ORDER — LOSARTAN POTASSIUM 100 MG/1
100 TABLET ORAL DAILY
COMMUNITY
Start: 2024-03-11

## 2024-08-27 ASSESSMENT — PAIN SCALES - GENERAL: PAINLEVEL: NO PAIN (0)

## 2024-08-27 NOTE — PATIENT INSTRUCTIONS
Stop Vagisil   Use Plain Vaseline to vulva when irritated.   Topical Steroid (Clobetasol) up to twice a day for no more than 2 weeks at a time.  May use as needed for irritation.      Follow up if any further concerns.     Dr. Shannan Alvarez MD
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
## 2024-08-27 NOTE — NURSING NOTE
Chief Complaint   Patient presents with    Consult     Recurrent Vaginal and Bladder infection        Medication Reconciliation: complete      Mara Thakkar LPN........................8/27/2024  8:54 AM

## 2024-08-29 LAB — BACTERIA UR CULT: NORMAL

## 2024-09-18 DIAGNOSIS — H35.351 CYSTOID MACULAR EDEMA, RIGHT: Primary | ICD-10-CM

## 2024-10-03 ENCOUNTER — OFFICE VISIT (OUTPATIENT)
Dept: OPHTHALMOLOGY | Facility: CLINIC | Age: 79
End: 2024-10-03
Payer: MEDICARE

## 2024-10-03 DIAGNOSIS — H35.351 CYSTOID MACULAR EDEMA, RIGHT: ICD-10-CM

## 2024-10-03 PROCEDURE — G0463 HOSPITAL OUTPT CLINIC VISIT: HCPCS

## 2024-10-03 PROCEDURE — 99214 OFFICE O/P EST MOD 30 MIN: CPT

## 2024-10-03 PROCEDURE — 92134 CPTRZ OPH DX IMG PST SGM RTA: CPT

## 2024-10-03 ASSESSMENT — SLIT LAMP EXAM - LIDS: COMMENTS: MGD, TELANGECTASIA

## 2024-10-03 ASSESSMENT — VISUAL ACUITY
OS_SC: 20/20
OD_PH_SC: 20/50
OD_SC+: -2
METHOD: SNELLEN - LINEAR
OD_PH_SC+: -2
OD_SC: 20/70

## 2024-10-03 ASSESSMENT — CUP TO DISC RATIO
OD_RATIO: 0.4
OS_RATIO: 0.5

## 2024-10-03 ASSESSMENT — EXTERNAL EXAM - RIGHT EYE: OD_EXAM: NO PROPTOSIS OR SCLERAL SHOW

## 2024-10-03 ASSESSMENT — TONOMETRY
OD_IOP_MMHG: 16
OS_IOP_MMHG: 12
IOP_METHOD: TONOPEN

## 2024-10-03 ASSESSMENT — EXTERNAL EXAM - LEFT EYE: OS_EXAM: NO PROPTOSIS OR SCLERAL SHOW

## 2024-10-03 NOTE — PROGRESS NOTES
CC: follow up CME    HPI: Patient previously following with Dr. Quintanilla for CME in the right eye, last seen 6 months ago. No changes in vision since last visit. She has a stable floater in the left eye that is chronic. Using drops as prescribed. She was seen by a local eye doctor in [place] this winter and did get two Avastin injections to the right eye, one in Dec 2023 and another in March 2024. She was not having worsening vision at that time and she did not notice a difference with the injections    Current medications:  - Ketorolac twice daily right eye  - Restasis BID both eyes  - Ointment at bedtime  - AT both eyes    Ocular Hx:  - LEFT RD repair ([place] 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 Ellis    PMHx:   HTN  Asthma on Advair (corticosteroid inhaler)  Hypothyroidism  Tobacco use      Imaging:    OCT: 10/03/2024  Right eye: ERM with flattened foveal contour and continued improvement in IRF no drusen, thin choroid, PHF , stable  Left eye: mild ERM with normal foveal contour, no fluid, choroid thin, no drusen, hyaloid , stable    Retina Laser procedures:  None    Intravitreal injections:  2 injections right eye in 2021 and 2022 in [place]  Recently another 2 injections right eye Dec 2023 and March 2024    Assessment/ Plan: 10/03/2024       # Cystoid Macular Edema Right Eye   chronic - earliest OCTs from 2017 show more fluid than current.   - fluid significantly improved on OCTs btw 10/2020 and 4/2021. Vision did not improve. When her CME was nearly resolved she was on Durezol daily to 3x/week, Dorzolamide BID, and ketorolac BID.   - Epiretinal membrane noted on OCT, also on left eye   - confirmed by IVFA previously.  No polyps on ICG  Etiology is unclear. DDx includes  vascular (no h/o RVO), inflammatory (intermediate uveitis, [place] Karina), ERM (current ERM is very mild), degenerative.   - 11/2021 & 1/2022 received two Avastin injections in [place]- was told there was some improvement   -Patient does have a central scar which is also contributing to poor central vision and she also had diplopia that is helping her with suppressing the image  - Received 2 injections in [place] over the winter most recently March 2024. Unclear if she was having worsening IRF. Patient did not notice change in vision before or after injections  - currently on  Ketorolac twice daily right eye and  predforte 1 drop QID  OD   - OCT shows improvement in IRF with mild persistent IRF cysts, vision now PH to 20/50 (was 20/80)  - will continue a very slow taper of steroids to see the least amount of eye drops needed to control the CME, make predforte 1 drop once a day for 1 month (was TID then BID) then stop  - Decrease Ketorolac 1 drop right eye once a day for 2 months then stop  - Ms Newsome will try to follow-up with a retina specialist in [place] when she is away and I will see her when she gets back in May hopefully.    # Corneal scar right eye, stable  - follows with Dr. 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 Caleb Andujar MD     Medical Retina  [facility]     Attending Physician Attestation:  Complete documentation of historical and exam elements from  today's encounter can be found in the full encounter summary report (not reduplicated in this progress note).  I personally obtained the chief complaint(s) and history of present illness.  I confirmed and edited as necessary the review of systems, past medical/surgical history, family history, social history, and examination findings as documented by others; and I examined the patient myself.  I personally reviewed the relevant tests, images, and reports as documented above.  I formulated and edited as necessary the assessment and plan and discussed the findings and management plan with the patient and family. Amy Andujar MD

## 2025-06-03 DIAGNOSIS — H35.351 CYSTOID MACULAR EDEMA, RIGHT: Primary | ICD-10-CM

## 2025-06-12 ENCOUNTER — OFFICE VISIT (OUTPATIENT)
Dept: OPHTHALMOLOGY | Facility: CLINIC | Age: 80
End: 2025-06-12
Payer: MEDICARE

## 2025-06-12 DIAGNOSIS — H35.351 CYSTOID MACULAR EDEMA, RIGHT: ICD-10-CM

## 2025-06-12 DIAGNOSIS — H40.009 GLAUCOMA SUSPECT, UNSPECIFIED LATERALITY: Primary | ICD-10-CM

## 2025-06-12 PROCEDURE — 92134 CPTRZ OPH DX IMG PST SGM RTA: CPT

## 2025-06-12 PROCEDURE — 92133 CPTRZD OPH DX IMG PST SGM ON: CPT

## 2025-06-12 RX ORDER — CYCLOSPORINE 0.5 MG/ML
1 EMULSION OPHTHALMIC 2 TIMES DAILY
Qty: 5.5 ML | Refills: 11 | Status: SHIPPED | OUTPATIENT
Start: 2025-06-12

## 2025-06-12 RX ORDER — KETOROLAC TROMETHAMINE 5 MG/ML
1 SOLUTION OPHTHALMIC 2 TIMES DAILY
Qty: 5 ML | Refills: 2 | Status: SHIPPED | OUTPATIENT
Start: 2025-06-12

## 2025-06-12 ASSESSMENT — VISUAL ACUITY
OD_SC: 20/80
OS_SC: 20/20
OD_SC+: +1
OS_SC+: -1
OD_PH_SC: 20/60
METHOD: SNELLEN - LINEAR
OD_PH_SC+: -1

## 2025-06-12 ASSESSMENT — TONOMETRY
IOP_METHOD: TONOPEN
OD_IOP_MMHG: 25
IOP_METHOD: TONOPEN
OS_IOP_MMHG: 17
OD_IOP_MMHG: 25

## 2025-06-12 ASSESSMENT — SLIT LAMP EXAM - LIDS: COMMENTS: MGD, TELANGECTASIA

## 2025-06-12 ASSESSMENT — CUP TO DISC RATIO
OS_RATIO: 0.5
OD_RATIO: 0.4

## 2025-06-12 ASSESSMENT — EXTERNAL EXAM - LEFT EYE: OS_EXAM: NO PROPTOSIS OR SCLERAL SHOW

## 2025-06-12 ASSESSMENT — EXTERNAL EXAM - RIGHT EYE: OD_EXAM: NO PROPTOSIS OR SCLERAL SHOW

## 2025-06-12 NOTE — PROGRESS NOTES
CC: follow up CME    HPI: Patient is a 79 year old previously followed by Dr. Quintanilla for CME in the right eye, last seen 6 months ago. No changes in vision since last visit. She has a stable floater in the left eye that is chronic. Using drops as prescribed. She was seen by a local eye doctor in [place] this winter and did get two Avastin injections to the right eye, one in Dec 2023 and another in March 2024. She was not having worsening vision at that time and she did not notice a difference with the injections    Current medications:  - Ketorolac twice daily right eye  - Prednisolone once daily right eye   - Restasis BID both eyes  - Ointment at bedtime  - AT both eyes    Ocular Hx:  - LEFT RD repair (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 Ellis    PMHx:   HTN  Asthma on Advair (corticosteroid inhaler)  Hypothyroidism  Tobacco use      Imaging:  OCT: 06/12/2025  Right eye: ERM with flattened foveal contour and persistent IRF no drusen, thin choroid, PHF , stable  Left eye: mild ERM with normal foveal contour, no fluid, choroid thin, no drusen, hyaloid , stable      RNFL: 06/12/2025  OD: S, I thinning   OS: S,I thinning     Retina Laser procedures:  None    Intravitreal injections:  2 injections right eye in 2021 and 2022 in [place]  Recently another 2 injections right eye Dec 2023 and March 2024  Avastin injection 3/28/2025 [place]    Assessment/ Plan: 06/12/2025       # Cystoid Macular Edema Right Eye   chronic - earliest OCTs from 2017 show more fluid than current.   - fluid significantly improved on OCTs btw 10/2020 and 4/2021. Vision did not improve. When her CME was nearly resolved she was on Durezol daily to 3x/week, Dorzolamide BID, and ketorolac BID.   -  Epiretinal membrane noted on OCT, also on left eye   - confirmed by IVFA previously.  No polyps on ICG  Etiology is unclear. DDx includes vascular (no h/o RVO), inflammatory (intermediate uveitis, [place] Karina), ERM (current ERM is very mild), degenerative.   - 11/2021 & 1/2022 received two Avastin injections in [place]- was told there was some improvement   -Patient does have a central scar which is also contributing to poor central vision and she also had diplopia that is helping her with suppressing the image  - Received 2 injections in [place] over the winter most recently March 2024. Unclear if she was having worsening IRF. Patient did not notice change in vision before or after injections  - currently on  Ketorolac twice daily right eye and  predforte 1 drop QID  OD   - OCT shows improvement in IRF with mild persistent IRF cysts, vision now PH to 20/50 (was 20/80)  - will continue a very slow taper of steroids to see the least amount of eye drops needed to control the CME, make predforte 1 drop once a day for 1 month (was TID then BID) then stop  - Decrease Ketorolac 1 drop right eye once a day for 2 months then stop  - Ms Newsome will try to follow-up with a retina specialist in [place] when she is away and I will see her when she gets back in May hopefully.  - Saw Dr. Silva on 03/28/2025 and had an injection of avastin.    - 06/12/2025: persistent IRF, iop 25 , RNFL shows sup and inf thinning,stop predforte keep ketorolac BID follow-up in 2 months with OCT macula, repeat OCT RNFL in 6 months (dec 2025)    # Corneal scar right eye, stable  - follows with Dr. 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 Ellis     Follow-up 2 months with OCT macula both eyes     Amy Andujar MD     Medical Retina  [facility]     Attending Physician Attestation:  Complete documentation of historical and exam elements from today's encounter can be found in the full encounter summary report (not reduplicated in this progress note).  I personally obtained the chief complaint(s) and history of present illness.  I confirmed and edited as necessary the review of systems, past medical/surgical history, family history, social history, and examination findings as documented by others; and I examined the patient myself.  I personally reviewed the relevant tests, images, and reports as documented above.  I formulated and edited as necessary the assessment and plan and discussed the findings and management plan with the patient and family. Amy Andujar MD

## 2025-06-12 NOTE — NURSING NOTE
Chief Complaints and History of Present Illnesses   Patient presents with    Follow Up     Cystoid macular edema, right     Chief Complaint(s) and History of Present Illness(es)       Follow Up              Comments: Cystoid macular edema, right              Comments    Pt states no change in VA since last visit  Occasional floaters , no flashes   No eye pain or tearing  Using:   Ketorolac BID right eye  Prednisolone QD right eye   Restasis BID both eyes    Goldie Lopez COT 7:48 AM June 12, 2025

## 2025-07-13 ENCOUNTER — HEALTH MAINTENANCE LETTER (OUTPATIENT)
Age: 80
End: 2025-07-13

## 2025-07-22 DIAGNOSIS — H40.009 GLAUCOMA SUSPECT, UNSPECIFIED LATERALITY: ICD-10-CM

## 2025-07-22 DIAGNOSIS — H35.351 CYSTOID MACULAR EDEMA, RIGHT: Primary | ICD-10-CM

## 2025-07-22 DIAGNOSIS — H40.003 GLAUCOMA SUSPECT OF BOTH EYES: ICD-10-CM

## 2025-07-28 ENCOUNTER — OFFICE VISIT (OUTPATIENT)
Dept: OPHTHALMOLOGY | Facility: CLINIC | Age: 80
End: 2025-07-28
Attending: OPHTHALMOLOGY
Payer: MEDICARE

## 2025-07-28 DIAGNOSIS — H04.123 DRY EYE SYNDROME OF BOTH EYES: Primary | ICD-10-CM

## 2025-07-28 PROCEDURE — G0463 HOSPITAL OUTPT CLINIC VISIT: HCPCS | Performed by: OPHTHALMOLOGY

## 2025-07-28 PROCEDURE — 99214 OFFICE O/P EST MOD 30 MIN: CPT | Mod: GC | Performed by: OPHTHALMOLOGY

## 2025-07-28 RX ORDER — CYCLOSPORINE 0.5 MG/ML
1 EMULSION OPHTHALMIC 2 TIMES DAILY
Qty: 1.5 ML | Refills: 11 | Status: SHIPPED | OUTPATIENT
Start: 2025-07-28 | End: 2026-01-24

## 2025-07-28 ASSESSMENT — EXTERNAL EXAM - LEFT EYE: OS_EXAM: NO PROPTOSIS OR SCLERAL SHOW

## 2025-07-28 ASSESSMENT — VISUAL ACUITY
METHOD: SNELLEN - LINEAR
OD_PH_SC: 20/60
OS_SC: 20/20
OD_SC: 20/100
OD_PH_SC+: -2

## 2025-07-28 ASSESSMENT — SLIT LAMP EXAM - LIDS: COMMENTS: MGD, TELANGECTASIA

## 2025-07-28 ASSESSMENT — TONOMETRY
OS_IOP_MMHG: 15
IOP_METHOD: ICARE
OD_IOP_MMHG: 14

## 2025-07-28 ASSESSMENT — EXTERNAL EXAM - RIGHT EYE: OD_EXAM: NO PROPTOSIS OR SCLERAL SHOW

## 2025-07-28 NOTE — NURSING NOTE
Chief Complaints and History of Present Illnesses   Patient presents with    Dry Eye(s) Follow-Up     Chief Complaint(s) and History of Present Illness(es)       Dry Eye(s) Follow-Up              Laterality: both eyes    Duration: 1 year              Comments    Pt. States that dryness continues to persist BE. No change in VA BE. No pain BE. No flashes or floaters BE.   Yolie CLARK 7:11 AM July 28, 2025

## 2025-07-28 NOTE — PROGRESS NOTES
CC: Cornea scarring OD    HPI: Annabelle Newsome is a 79 year old female presenting for 1 year  follow up. H/o thyroid eye disease, exposure, and corneal scarring OD.      Interval hx 07/28/2025  Chief Complaint(s) and History of Present Illness(es)       Dry Eye(s) Follow-Up    In both eyes.  Duration of 1 year.             Comments    Pt. States that dryness continues to persist BE. No change in VA BE. No pain BE. No flashes or floaters BE.   Yolie Rodriguez COT 7:11 AM July 28, 2025           Past ocular history:  Retinal detachment both eyes, right eye 12/08, left eye 11/01  Thyroid eye disease s/p multiple lid surgeries  Cataract extraction intraocular lens both eyes   S/p yag cap right eye   Keratitis and corneal scar right eye   Binocular diplopia, not having any surgery for that - longstanding for 10+ years  Fuchs' dystrophy OU    Drops 7/28/2025:   Refresh Preservative free q1-2 h OU  Refresh PM at bedtime OU  Restasis BID each eye  Ketorolac daily right eye     Assessment:    Patient does not have dexterity due to RA to squeeze restasis bottles and needs PF single use vials 7/28/2025    # Corneal guttata, 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 Scar is too deep for PTK (~40% ?). Patient doing well at this time and would like to continue observation.   - K transplant may make binocular diplopia worse, but would likely help monocular diplopia/glare  - Not interested at this time  - discussed possible repeat trial of scleral lens OD - but patient did not tolerate in the past, couldn't get them out and felt dry    # Dry eyes each eye   - stable doing well, no PEE on exam 05/11/23    - continue restasis BID OU - refilled   - continue PF tears q~2 hours each eye.   - Emphasized need for art tears.    #. Thyroid eye disease  - H/o multiple lid surgeries  right eye    - last visit with [facility] - decided not to pursue any surgery at this time 5/2019  - not active today     #. Esotropia and right hypertropia  - Right esotropia, strab surgery not recommended, seen by Dr. Ellis 5/21/19  - Dr. Arceo recommended trial of blurred +20 CL versus black occluder lens, patient did not find it helpful and is no longer wearing it    #. H/o Retinal detachment  Both eyes   - follows with retina    # Hx of CME, right eye  - follows w/ Dr. Dewitt  - on ketorolac BID     # ERM  - non-surgical per retina team    Plan 7/28/2025:  Single use Restasis vials , BID OU  F/u cornea 1 year, sooner PRN      Roshan Gillette MD  Cornea and External Disease Fellow  [facility]    Attending Physician Attestation:  Complete documentation of historical and exam elements from today's encounter can be found in the full encounter summary report (not reduplicated in this progress note).  I personally obtained the chief complaint(s) and history of present illness.  I confirmed and edited as necessary the review of systems, past medical/surgical history, family history, social history, and examination findings as documented by others; and I examined the patient myself.  I personally reviewed the relevant tests, images, and reports as documented above.  I formulated and edited as necessary the assessment and plan and discussed the findings and management plan with the patient and family. - Adarsh Esparza MD

## 2025-07-29 ENCOUNTER — OFFICE VISIT (OUTPATIENT)
Dept: URGENT CARE | Facility: URGENT CARE | Age: 80
End: 2025-07-29
Payer: MEDICARE

## 2025-07-29 VITALS
RESPIRATION RATE: 18 BRPM | TEMPERATURE: 97.9 F | BODY MASS INDEX: 18.33 KG/M2 | DIASTOLIC BLOOD PRESSURE: 92 MMHG | SYSTOLIC BLOOD PRESSURE: 158 MMHG | WEIGHT: 110 LBS | HEIGHT: 65 IN | HEART RATE: 69 BPM | OXYGEN SATURATION: 97 %

## 2025-07-29 DIAGNOSIS — N89.8 VAGINAL ITCHING: Primary | ICD-10-CM

## 2025-07-29 DIAGNOSIS — I10 HYPERTENSION, UNSPECIFIED TYPE: ICD-10-CM

## 2025-07-29 DIAGNOSIS — N76.0 BACTERIAL VAGINOSIS: ICD-10-CM

## 2025-07-29 DIAGNOSIS — B96.89 BACTERIAL VAGINOSIS: ICD-10-CM

## 2025-07-29 DIAGNOSIS — N89.8 VAGINAL ODOR: ICD-10-CM

## 2025-07-29 LAB
CLUE CELLS: ABNORMAL
TRICHOMONAS, WET PREP: ABNORMAL
WBC'S/HIGH POWER FIELD, WET PREP: ABNORMAL
YEAST, WET PREP: ABNORMAL

## 2025-07-29 PROCEDURE — 3078F DIAST BP <80 MM HG: CPT | Performed by: PHYSICIAN ASSISTANT

## 2025-07-29 PROCEDURE — 99213 OFFICE O/P EST LOW 20 MIN: CPT | Performed by: PHYSICIAN ASSISTANT

## 2025-07-29 PROCEDURE — 87210 SMEAR WET MOUNT SALINE/INK: CPT | Performed by: PHYSICIAN ASSISTANT

## 2025-07-29 PROCEDURE — 3077F SYST BP >= 140 MM HG: CPT | Performed by: PHYSICIAN ASSISTANT

## 2025-07-29 RX ORDER — METRONIDAZOLE 500 MG/1
500 TABLET ORAL 2 TIMES DAILY
Qty: 14 TABLET | Refills: 0 | Status: SHIPPED | OUTPATIENT
Start: 2025-07-29 | End: 2025-08-05

## 2025-07-29 NOTE — PROGRESS NOTES
Assessment & Plan     Vaginal itching    May use OTC monistat    You have a vaginal infection called bacterial vaginosis (BV). BV occurs when the good bacteria are outnumbered by the bad bacteria causes an imbalance in the vagina. BV is linked with sexual activity, but it's not a sexually transmitted infection (STI).   BV may or may not cause symptoms of thin, gray, milky-white, or sometimes green discharge, unpleasant odor or  fishy  smell.  BV is most often treated with medicines called antibiotics. These may be given as pills or as a vaginal cream.  Do not drink alcohol while on Flagyl or Metrogel as this can cause severe nausea and vomiting.    It's not known what causes BV, but certain factors can make the problem more likely. These can include:   Douching  Spermicides  Use of antibiotics  Change in hormone levels with pregnancy, breastfeeding, or menopause  Having sex with a new partner  Having sex with more than one partner      Patient has hx of BV in the past  She will follow up GYN when returning home    Hypertension, unspecified type    Patient advised to follow up with PCP for recheck blood pressure   Information given to patient on diet modifications, including lowering salt in diet, decrease calories, weight loss and exercise.  Monitor blood pressure at home and if BP maintains over 140/90 then advise having a recheck with PCP in 2 weeks.       Vaginal odor    You have a vaginal infection called bacterial vaginosis (BV). BV occurs when the good bacteria are outnumbered by the bad bacteria causes an imbalance in the vagina. BV is linked with sexual activity, but it's not a sexually transmitted infection (STI).   BV may or may not cause symptoms of thin, gray, milky-white, or sometimes green discharge, unpleasant odor or  fishy  smell.  BV is most often treated with medicines called antibiotics. These may be given as pills or as a vaginal cream.  Do not drink alcohol while on Flagyl or Metrogel as this  "can cause severe nausea and vomiting.    It's not known what causes BV, but certain factors can make the problem more likely. These can include:   Douching  Spermicides  Use of antibiotics  Change in hormone levels with pregnancy, breastfeeding, or menopause  Having sex with a new partner  Having sex with more than one partner      - metroNIDAZOLE (FLAGYL) 500 MG tablet  Dispense: 14 tablet; Refill: 0       Follow up with GYN    No follow-ups on file.    Murray Clark, [facility], PA-C  [facility] BARNEY Martinez is a 79 year old female who presents to clinic today for the following health issues:  Chief Complaint   Patient presents with    Urgent Care     Itchy, smells         7/29/2025     5:59 PM   Additional Questions   Roomed by Bia Del Rio   Accompanied by self     HPI  Review of Systems  Constitutional, HEENT, cardiovascular, pulmonary, gi and gu systems are negative, except as otherwise noted.      Objective    BP (!) 158/92 (BP Location: Right arm, Patient Position: Sitting, Cuff Size: Adult Regular)   Pulse 69   Temp 97.9  F (36.6  C) (Tympanic)   Resp 18   Ht 1.651 m (5' 5\")   Wt 49.9 kg (110 lb)   LMP  (LMP Unknown)   SpO2 97%   BMI 18.30 kg/m    Physical Exam   GENERAL: alert and no distress  ABDOMEN: soft, nontender, no hepatosplenomegaly, no masses and bowel sounds normal   (female): deferred  MS: no gross musculoskeletal defects noted, no edema  SKIN: no suspicious lesions or rashes  NEURO: Normal strength and tone, mentation intact and speech normal  PSYCH: mentation appears normal, affect normal/bright      Results for orders placed or performed in visit on 07/29/25   Wet prep - Clinic Collect     Status: Abnormal    Specimen: Vagina; Swab   Result Value Ref Range    Trichomonas Absent Absent    Yeast Absent Absent    Clue Cells Absent Absent    WBCs/high power field 2+ (A) None           "

## 2025-07-29 NOTE — PROGRESS NOTES
Urgent Care Clinic Visit               7/29/2025     5:59 PM   Additional Questions   Roomed by Bia Del Rio   Accompanied by self     Pre-Provider Visit Orders - Vaginal Infection  Reason for visit:  Possible vaginal infections

## 2025-07-31 ENCOUNTER — OFFICE VISIT (OUTPATIENT)
Dept: OPHTHALMOLOGY | Facility: CLINIC | Age: 80
End: 2025-07-31
Payer: MEDICARE

## 2025-07-31 DIAGNOSIS — H35.351 CYSTOID MACULAR EDEMA, RIGHT: ICD-10-CM

## 2025-07-31 DIAGNOSIS — H40.003 GLAUCOMA SUSPECT OF BOTH EYES: ICD-10-CM

## 2025-07-31 PROCEDURE — G0463 HOSPITAL OUTPT CLINIC VISIT: HCPCS

## 2025-07-31 PROCEDURE — 92134 CPTRZ OPH DX IMG PST SGM RTA: CPT

## 2025-07-31 ASSESSMENT — CUP TO DISC RATIO
OD_RATIO: 0.4
OS_RATIO: 0.5

## 2025-07-31 ASSESSMENT — VISUAL ACUITY
OD_PH_SC+: -1
OS_SC: 20/20
OS_SC+: -1
OD_SC: 20/100
OD_PH_SC: 20/70
METHOD: SNELLEN - LINEAR

## 2025-07-31 ASSESSMENT — EXTERNAL EXAM - RIGHT EYE: OD_EXAM: NO PROPTOSIS OR SCLERAL SHOW

## 2025-07-31 ASSESSMENT — EXTERNAL EXAM - LEFT EYE: OS_EXAM: NO PROPTOSIS OR SCLERAL SHOW

## 2025-07-31 ASSESSMENT — TONOMETRY
IOP_METHOD: TONOPEN
OS_IOP_MMHG: 14
OD_IOP_MMHG: 17

## 2025-07-31 ASSESSMENT — SLIT LAMP EXAM - LIDS: COMMENTS: MGD, TELANGECTASIA

## 2025-07-31 NOTE — PROGRESS NOTES
CC: follow up CME    HPI: Patient is a 79 year old previously followed by Dr. Quintanilla for CME in the right eye, last seen 6 months ago. No changes in vision since last visit. She has a stable floater in the left eye that is chronic. Using drops as prescribed. She was seen by a local eye doctor in [place] this winter and did get two Avastin injections to the right eye, one in Dec 2023 and another in March 2024. She was not having worsening vision at that time and she did not notice a difference with the injections    Interval History: Last retina clinic visit was 6/12/25 Patient reports stable vision in both eyes.  Denies any new ophthalmic complaints.    Current medications:  - Ketorolac once daily daily right eye  - Restasis BID both eyes  - Ointment at bedtime  - AT both eyes    Ocular Hx:  - LEFT RD repair (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 Ellis    PMHx:   HTN  Asthma on Advair (corticosteroid inhaler)  Hypothyroidism  Tobacco use      Imaging:  OCT: 07/31/2025  Right eye: ERM with flattened foveal contour and increased IRF no drusen, thin choroid, PHF ; Worse  Left eye: mild ERM with normal foveal contour, no fluid, choroid thin, no drusen, hyaloid ; stable      RNFL: 06/12/2025  OD: S, I thinning   OS: S,I thinning     Retina Laser procedures:  None    Intravitreal injections:  2 injections right eye in 2021 and 2022 in [place]  Recently another 2 injections right eye Dec 2023 and March 2024  Avastin injection 3/28/2025 [place]    Assessment/ Plan: 07/31/2025       # Cystoid Macular Edema Right Eye   chronic - earliest OCTs from 2017 show more fluid than current.   - fluid significantly improved on OCTs btw 10/2020 and 4/2021. Vision did not improve. When her  CME was nearly resolved she was on Durezol daily to 3x/week, Dorzolamide BID, and ketorolac BID.   - Epiretinal membrane noted on OCT, also on left eye   - confirmed by IVFA previously.  No polyps on ICG  Etiology is unclear. DDx includes vascular (no h/o RVO), inflammatory (intermediate uveitis, [place]), ERM (current ERM is very mild), degenerative.   - 11/2021 & 1/2022 received two Avastin injections in [place]- was told there was some improvement   -Patient does have a central scar which is also contributing to poor central vision and she also had diplopia that is helping her with suppressing the image  - Received 2 injections in [place] over the winter most recently March 2024. Unclear if she was having worsening IRF. Patient did not notice change in vision before or after injections  - currently on  Ketorolac twice daily right eye and  predforte 1 drop QID  OD   - OCT shows improvement in IRF with mild persistent IRF cysts, vision now PH to 20/50 (was 20/80)  - will continue a very slow taper of steroids to see the least amount of eye drops needed to control the CME, make predforte 1 drop once a day for 1 month (was TID then BID) then stop  - Decrease Ketorolac 1 drop right eye once a day for 2 months then stop  - Ms Newsome will try to follow-up with a retina specialist in [place] when she is away and I will see her when she gets back in May hopefully.  - Saw Dr. Silva on 03/28/2025 and had an injection of avastin.    - 07/31/2025:  increased IRF , IOP improved to 17.  Increasing Ketorolac back to BID dosing, keep off predforte , follow-up in October consider repeating RNFL and OCT macula     # Corneal scar right eye, stable  - follows with Dr. 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 Ellis     Follow-up 2 months with OCT macula both eyes     Asad Freedman MD  Ophthalmology PGY-3  [facility]      Amy Andujar MD     Medical Retina  [facility]     Attending Physician Attestation:  Complete documentation of historical and exam elements from today's encounter can be found in the full encounter summary report (not reduplicated in this progress note).  I personally obtained the chief complaint(s) and history of present illness.  I confirmed and edited as necessary the review of systems, past medical/surgical history, family history, social history, and examination findings as documented by others; and I examined the patient myself.  I personally reviewed the relevant tests, images, and reports as documented above.  I formulated and edited as necessary the assessment and plan and discussed the findings and management plan with the patient and family. Amy Andujar MD

## 2025-07-31 NOTE — NURSING NOTE
Chief Complaints and History of Present Illnesses   Patient presents with    Follow Up      Cystoid Macular Edema Right Eye     Chief Complaint(s) and History of Present Illness(es)       Follow Up              Comments:  Cystoid Macular Edema Right Eye              Comments    Pt states no change in VA since last visit  Occasional floaters , same as before  No flashes, eye pain or redness  Using :  Ketorolac QD right eye  Restasis BID both eyes    Goldie Lopez COT 7:09 AM July 31, 2025

## 2025-08-24 ENCOUNTER — MYC MEDICAL ADVICE (OUTPATIENT)
Dept: OPHTHALMOLOGY | Facility: CLINIC | Age: 80
End: 2025-08-24
Payer: MEDICARE

## 2025-08-24 DIAGNOSIS — H04.123 DRY EYE SYNDROME OF BOTH EYES: Primary | ICD-10-CM

## 2025-08-25 RX ORDER — CYCLOSPORINE 0.5 MG/ML
1 EMULSION OPHTHALMIC 2 TIMES DAILY
Qty: 180 EACH | Refills: 3 | Status: SHIPPED | OUTPATIENT
Start: 2025-08-25